# Patient Record
Sex: FEMALE | Race: WHITE | NOT HISPANIC OR LATINO | Employment: FULL TIME | URBAN - METROPOLITAN AREA
[De-identification: names, ages, dates, MRNs, and addresses within clinical notes are randomized per-mention and may not be internally consistent; named-entity substitution may affect disease eponyms.]

---

## 2017-02-14 ENCOUNTER — TELEPHONE (OUTPATIENT)
Dept: FAMILY MEDICINE | Facility: CLINIC | Age: 27
End: 2017-02-14

## 2017-02-14 NOTE — TELEPHONE ENCOUNTER
Panel Management - Physical w/ pap    Pt is due for Physical w/ pap  Called pt and  Pt no longer lives around this area, will note in demographics   MADELEINE Loya MA

## 2017-03-22 ENCOUNTER — OFFICE VISIT (OUTPATIENT)
Dept: FAMILY MEDICINE | Facility: CLINIC | Age: 27
End: 2017-03-22
Payer: COMMERCIAL

## 2017-03-22 VITALS
WEIGHT: 184 LBS | BODY MASS INDEX: 30.66 KG/M2 | DIASTOLIC BLOOD PRESSURE: 68 MMHG | HEART RATE: 64 BPM | HEIGHT: 65 IN | RESPIRATION RATE: 20 BRPM | SYSTOLIC BLOOD PRESSURE: 114 MMHG | TEMPERATURE: 97.9 F

## 2017-03-22 DIAGNOSIS — Z12.4 CERVICAL CANCER SCREENING: ICD-10-CM

## 2017-03-22 DIAGNOSIS — Z11.3 SCREEN FOR STD (SEXUALLY TRANSMITTED DISEASE): ICD-10-CM

## 2017-03-22 DIAGNOSIS — Z30.013 ENCOUNTER FOR INITIAL PRESCRIPTION OF INJECTABLE CONTRACEPTIVE: Primary | ICD-10-CM

## 2017-03-22 LAB — BETA HCG QUAL IFA URINE: NEGATIVE

## 2017-03-22 PROCEDURE — 96372 THER/PROPH/DIAG INJ SC/IM: CPT | Performed by: NURSE PRACTITIONER

## 2017-03-22 PROCEDURE — 87491 CHLMYD TRACH DNA AMP PROBE: CPT | Performed by: NURSE PRACTITIONER

## 2017-03-22 PROCEDURE — G0145 SCR C/V CYTO,THINLAYER,RESCR: HCPCS | Performed by: NURSE PRACTITIONER

## 2017-03-22 PROCEDURE — 84703 CHORIONIC GONADOTROPIN ASSAY: CPT | Performed by: NURSE PRACTITIONER

## 2017-03-22 PROCEDURE — 99213 OFFICE O/P EST LOW 20 MIN: CPT | Performed by: NURSE PRACTITIONER

## 2017-03-22 NOTE — LETTER
[facility]  [address] [facility] [address]  Phone: [phone]  Fax: [phone]    March 28, 2017    Julia High  [address] [facility] [address]          Dear Ms. High,    The results of your recent lab test for chlamydia was negative.       If you have any further questions or problems, please contact our office.    Sincerely,      Christina Villalobos CNP/ riya

## 2017-03-22 NOTE — LETTER
March 28, 2017      Julia Lennox  [address] [facility] [address]    Dear ,    I am happy to inform you that your recent cervical cancer screening test (PAP smear) was normal.      Preventative screenings such as this help to ensure your health for years to come. You should repeat a pap smear in 3 years, unless otherwise directed.      You will still need to return to the clinic every year for your annual exam and other preventive tests.     Please contact the clinic if you have further questions.       Sincerely,      Christina Villalobos, YOHANNES/rlm

## 2017-03-22 NOTE — PROGRESS NOTES
SUBJECTIVE:                                                    Julia High is a 26 year old female who presents to clinic today for the following health issues:      Contraception and Gyn exam         LMP  2 weeks ago. Last sexual activity 6 months ago.     Therapies tried and outcome: Was on Depo Provera 18 months ago. Was discontinued due to increased acne. Was on Oral contraceptive 6 months ago. Discontinued because she couldn't remember to take it daily.    Depo caused more acne  Pills can't remember to take  Thinking of implant  Contraception start -   Method interested in: depo or other injectable              Methods used previously: pill and depo provera  Problems with previous methods: see above    History of pregnancies:         Patient's last menstrual period was 2017.         No results found for: PAP  :    Para:    Menstrual cycle: irregular since on Depo  Flow: medium  History of migraines: no  Smoker: no  1st degree relative with History of: no                 Regular self breast exam: no    Accompanying Signs & Symptoms:   Dysuria: no  Vaginal discharge: no  Painful intercourse: no    Precipitating and/or Alleviating factors:    Currently sexually active: no  In stable relationship: no  Desire STD testing: no  Are you planning a pregnancy soon: no                 Body mass index is 30.62 kg/(m^2).      Results for orders placed or performed in visit on 17   Beta HCG qual IFA urine   Result Value Ref Range    Beta HCG Qual IFA Urine Negative NEG     No results found for: PAP  No known history of abnormal paps  Last pap done in [place] at [facility] in     HPI:     Patient Active Problem List   Diagnosis     Overweight (BMI 25.0-29.9)     No current outpatient prescriptions on file.  Labs reviewed in EPIC      Reviewed and updated as needed this visit by Provider  Allergies  Meds  Problems      ROS:  Constitutional, HEENT, cardiovascular, pulmonary, gi and gu systems are  "negative, except as otherwise noted.  INTEGUMENTARY/SKIN: history of acne  : normal menstrual cycles    PHYSICAL EXAM:   /68  Pulse 64  Temp 97.9  F (36.6  C) (Tympanic)  Resp 20  Ht 5' 5\" (1.651 m)  Wt 184 lb (83.5 kg)  LMP 03/08/2017  BMI 30.62 kg/m2  Body mass index is 30.62 kg/(m^2).  GENERAL APPEARANCE: healthy, alert and no distress  RESP: lungs clear to auscultation - no rales, rhonchi or wheezes  CV: regular rates and rhythm, normal S1 S2, no S3 or S4 and no murmur, click or rub   (female): normal cervix, adnexae, and uterus without masses or discharge  MS: extremities normal- no gross deformities noted  PSYCH: mentation appears normal and affect normal/bright      ASSESSMENT & PLAN:   (Z30.013) Encounter for initial prescription of injectable contraceptive  Comment:   Plan: MedroxyPROGESTERone Acetate 104 MG/0.65ML ASHWINI         injection            (Z11.3) Screen for STD (sexually transmitted disease)  Comment:   Plan: Beta HCG qual IFA urine, Chlamydia trachomatis         PCR            (Z12.4) Cervical cancer screening  Comment:   Plan: Pap imaged thin layer screen reflex to HPV if         ASCUS - recommend age 25 - 29            Depo Provera injection today  Patient Instructions     Results for orders placed or performed in visit on 03/22/17   Beta HCG qual IFA urine   Result Value Ref Range    Beta HCG Qual IFA Urine Negative NEG     Chlamydia and PAP tests ar pending    Depo Provera start today    If you would like to switch to Implanon, Dr. Bentley can place those in the clinic. Typically done the first 7 days of your menstrual cycle        Risks, benefits, side effects and rationale for treatment plan fully discussed with the patient and understanding expressed.    Christina Villalobos, KATIE-[facility]        "

## 2017-03-22 NOTE — MR AVS SNAPSHOT
"              After Visit Summary   3/22/2017    Julia High    MRN: [id number]           Patient Information     Date Of Birth          1990        Visit Information        Provider Department      3/22/2017 11:00 AM Christina Villalobos CNP [facility]        Today's Diagnoses     Contraceptive management    -  1    Encounter for initial prescription of injectable contraceptive        Screen for STD (sexually transmitted disease)        Cervical cancer screening          Care Instructions    Results for orders placed or performed in visit on 03/22/17   Beta HCG qual IFA urine   Result Value Ref Range    Beta HCG Qual IFA Urine Negative NEG     Chlamydia and PAP tests ar pending    Depo Provera start today    If you would like to switch to Implanon, Dr. Bentley can place those in the clinic. Typically done the first 7 days of your menstrual cycle            Follow-ups after your visit        Who to contact     If you have questions or need follow up information about today's clinic visit or your schedule please contact [facility] directly at [phone].  Normal or non-critical lab and imaging results will be communicated to you by MyChart, letter or phone within 4 business days after the clinic has received the results. If you do not hear from us within 7 days, please contact the clinic through [employer] or phone. If you have a critical or abnormal lab result, we will notify you by phone as soon as possible.  Submit refill requests through [employer] or call your pharmacy and they will forward the refill request to us. Please allow 3 business days for your refill to be completed.          Additional Information About Your Visit        [employer] Information     [employer] lets you send messages to your doctor, view your test results, renew your prescriptions, schedule appointments and more. To sign up, go to [email or web address] . Click on \"Log in\" on the left side of the screen, " "which will take you to the Welcome page. Then click on \"Sign up Now\" on the right side of the page.     You will be asked to enter the access code listed below, as well as some personal information. Please follow the directions to create your username and password.     Your access code is: [email or web address]  Expires: 2017 12:00 PM     Your access code will  in 90 days. If you need help or a new code, please call your [facility] or [phone].        Care EveryWhere ID     This is your Care EveryWhere ID. This could be used by other organizations to access your [facility] medical records  [id number]        Your Vitals Were     Pulse Temperature Respirations Height Last Period BMI (Body Mass Index)    64 97.9  F (36.6  C) (Tympanic) 20 5' 5\" (1.651 m) 2017 30.62 kg/m2       Blood Pressure from Last 3 Encounters:   17 114/68   16 124/80    Weight from Last 3 Encounters:   17 184 lb (83.5 kg)   16 183 lb (83 kg)              We Performed the Following     Beta HCG qual IFA urine     Chlamydia trachomatis PCR     Pap imaged thin layer screen reflex to HPV if ASCUS - recommend age 25 - 29          Today's Medication Changes          These changes are accurate as of: 3/22/17 12:00 PM.  If you have any questions, ask your nurse or doctor.               Start taking these medicines.        Dose/Directions    MedroxyPROGESTERone Acetate 104 MG/0.65ML Marie injection   Used for:  Encounter for initial prescription of injectable contraceptive   Started by:  Christina Villalobos CNP        Dose:  104 mg   Inject 104 mg Subcutaneous every 3 months   Quantity:  0.65 mL   Refills:  1            Where to get your medicines      These medications were sent to [facility] [address] [place] [address]  [address] [facility] [address]     Phone:  [phone]     MedroxyPROGESTERone Acetate 104 MG/0.65ML Marie injection                Primary Care Provider    None       No " address on file        Thank you!     Thank you for choosing [facility]  for your care. Our goal is always to provide you with excellent care. Hearing back from our patients is one way we can continue to improve our services. Please take a few minutes to complete the written survey that you may receive in the mail after your visit with us. Thank you!             Your Updated Medication List - Protect others around you: Learn how to safely use, store and throw away your medicines at www.disposemymeds.org.          This list is accurate as of: 3/22/17 12:00 PM.  Always use your most recent med list.                   Brand Name Dispense Instructions for use    MedroxyPROGESTERone Acetate 104 MG/0.65ML Marie injection     0.65 mL    Inject 104 mg Subcutaneous every 3 months

## 2017-03-22 NOTE — NURSING NOTE
"Chief Complaint   Patient presents with     Contraception       Initial /68  Pulse 64  Temp 97.9  F (36.6  C) (Tympanic)  Resp 20  Ht 5' 5\" (1.651 m)  Wt 184 lb (83.5 kg)  LMP 03/08/2017  BMI 30.62 kg/m2 Estimated body mass index is 30.62 kg/(m^2) as calculated from the following:    Height as of this encounter: 5' 5\" (1.651 m).    Weight as of this encounter: 184 lb (83.5 kg).  Medication Reconciliation: complete  "

## 2017-03-22 NOTE — PATIENT INSTRUCTIONS
Results for orders placed or performed in visit on 03/22/17   Beta HCG qual IFA urine   Result Value Ref Range    Beta HCG Qual IFA Urine Negative NEG     Chlamydia and PAP tests ar pending    Depo Provera start today    If you would like to switch to Implanon, Dr. Bentley can place those in the clinic. Typically done the first 7 days of your menstrual cycle

## 2017-03-23 LAB
C TRACH DNA SPEC QL NAA+PROBE: NORMAL
SPECIMEN SOURCE: NORMAL

## 2017-03-24 LAB
COPATH REPORT: NORMAL
PAP: NORMAL

## 2017-03-24 NOTE — PROGRESS NOTES
Negative chlamydia  Pap results pending  Please notify her of these results and send a hard copy if not on [employer].   Thanks. KATIE Finch

## 2017-06-22 ENCOUNTER — TELEPHONE (OUTPATIENT)
Dept: FAMILY MEDICINE | Facility: CLINIC | Age: 27
End: 2017-06-22

## 2017-06-22 NOTE — TELEPHONE ENCOUNTER
Patient left a vm stating she had gotten a birth control shot in March. She states she had talked to Christina about possibly switching birth control because the Depo gives her acne. She states she wants to go back on the pill but doesn't necessarily want to have to come in to see the provider.     Please advise.    Jewell Cooley-[place]

## 2017-06-22 NOTE — TELEPHONE ENCOUNTER
Spoke to pt, and she is going to schedule an appt with Dr Bentley to have the Implanon placed.  Maris Pineda RN

## 2017-07-12 ENCOUNTER — OFFICE VISIT (OUTPATIENT)
Dept: FAMILY MEDICINE | Facility: CLINIC | Age: 27
End: 2017-07-12
Payer: COMMERCIAL

## 2017-07-12 VITALS
SYSTOLIC BLOOD PRESSURE: 122 MMHG | OXYGEN SATURATION: 99 % | HEART RATE: 57 BPM | RESPIRATION RATE: 17 BRPM | BODY MASS INDEX: 30.62 KG/M2 | WEIGHT: 184 LBS | TEMPERATURE: 98 F | DIASTOLIC BLOOD PRESSURE: 80 MMHG

## 2017-07-12 DIAGNOSIS — Z30.8 ENCOUNTER FOR OTHER CONTRACEPTIVE MANAGEMENT: Primary | ICD-10-CM

## 2017-07-12 DIAGNOSIS — L70.0 ACNE VULGARIS: ICD-10-CM

## 2017-07-12 LAB — BETA HCG QUAL IFA URINE: NEGATIVE

## 2017-07-12 PROCEDURE — 84703 CHORIONIC GONADOTROPIN ASSAY: CPT | Performed by: FAMILY MEDICINE

## 2017-07-12 PROCEDURE — 99214 OFFICE O/P EST MOD 30 MIN: CPT | Performed by: FAMILY MEDICINE

## 2017-07-12 RX ORDER — CLINDAMYCIN AND BENZOYL PEROXIDE 10; 50 MG/G; MG/G
GEL TOPICAL 2 TIMES DAILY
Qty: 50 G | Refills: 3 | Status: SHIPPED | OUTPATIENT
Start: 2017-07-12 | End: 2022-10-27

## 2017-07-12 RX ORDER — DESOGESTREL AND ETHINYL ESTRADIOL 0.15-0.03
1 KIT ORAL DAILY
Qty: 84 TABLET | Refills: 1 | Status: SHIPPED | OUTPATIENT
Start: 2017-07-12 | End: 2018-02-02

## 2017-07-12 NOTE — NURSING NOTE
"Chief Complaint   Patient presents with     Contraception       Initial /80 (BP Location: Right arm, Patient Position: Chair, Cuff Size: Adult Large)  Pulse 57  Temp 98  F (36.7  C) (Tympanic)  Resp 17  Wt 184 lb (83.5 kg)  SpO2 99%  BMI 30.62 kg/m2 Estimated body mass index is 30.62 kg/(m^2) as calculated from the following:    Height as of 3/22/17: 5' 5\" (1.651 m).    Weight as of this encounter: 184 lb (83.5 kg).  Medication Reconciliation: complete    Health Maintenance that is potentially due pending provider review:  NONE    n/a    "

## 2017-07-12 NOTE — MR AVS SNAPSHOT
After Visit Summary   7/12/2017    Julia High    MRN: [id number]           Patient Information     Date Of Birth          1990        Visit Information        Provider Department      7/12/2017 10:40 AM Linwood Bentley MD [facility]        Today's Diagnoses     Encounter for other contraceptive management    -  1    Acne vulgaris          Care Instructions      Patient Education    Desogestrel, Ethinyl Estradiol Oral tablet, Desogestrel, Ethinyl Estradiol Oral tablet, Desogestrel, Ethinyl Estradiol Oral tablet, Inert Oral tablet    Desogestrel, Ethinyl Estradiol Oral tablet, Ethinyl Estradiol Oral tablet, Inert Oral tablet    Desogestrel, Ethinyl Estradiol Oral tablet, Inert Oral tablet  Desogestrel, Ethinyl Estradiol Oral tablet, Desogestrel, Ethinyl Estradiol Oral tablet, Desogestrel, Ethinyl Estradiol Oral tablet, Inert Oral tablet  What is this medicine?  ETHINYL ESTRADIOL; DESOGESTREL (ETH in il es tra DYE ole; jose alberto oh NEFTALI trel) is an oral contraceptive. The products combine two types of female hormones, an estrogen and a progestin. They are used to prevent ovulation and pregnancy.  This medicine may be used for other purposes; ask your health care provider or pharmacist if you have questions.  What should I tell my health care provider before I take this medicine?  They need to know if you have or ever had any of these conditions:    abnormal vaginal bleeding    blood vessel disease or blood clots    breast, cervical, endometrial, ovarian, liver, or uterine cancer    diabetes    gallbladder disease    heart disease or recent heart attack    high blood pressure    high cholesterol    kidney disease    liver disease    migraine headaches    stroke    systemic lupus erythematosus (SLE)    tobacco smoker    an unusual or allergic reaction to estrogens, progestins, other medicines, foods, dyes, or preservatives    pregnant or trying to get pregnant    breast-feeding  How  should I use this medicine?  Take this medicine by mouth. To reduce nausea, this medicine may be taken with food. Follow the directions on the prescription label. Take this medicine at the same time each day and in the order directed on the package. Do not take your medicine more often than directed.  Contact your pediatrician regarding the use of this medicine in children. Special care may be needed. This medicine has been used in female children who have started having menstrual periods.  A patient package insert for the product will be given with each prescription and refill. Read this sheet carefully each time. The sheet may change frequently.  Overdosage: If you think you have taken too much of this medicine contact a poison control center or emergency room at once.  NOTE: This medicine is only for you. Do not share this medicine with others.  What if I miss a dose?  If you miss a dose, refer to the patient information sheet you received with your medicine for direction. If you miss more than one pill, this medicine may not be as effective and you may need to use another form of birth control.  What may interact with this medicine?    acetaminophen    antibiotics or medicines for infections, especially rifampin, rifabutin, rifapentine, and griseofulvin, and possibly penicillins or tetracyclines    aprepitant    ascorbic acid (vitamin C)    atorvastatin    barbiturate medicines, such as phenobarbital    bosentan    carbamazepine    caffeine    clofibrate    cyclosporine    dantrolene    doxercalciferol    felbamate    grapefruit juice    hydrocortisone    medicines for anxiety or sleeping problems, such as diazepam or temazepam    medicines for diabetes, including pioglitazone    mineral oil    modafinil    mycophenolate    nefazodone    oxcarbazepine    phenytoin    prednisolone    ritonavir or other medicines for HIV infection or AIDS    rosuvastatin    selegiline    soy isoflavones supplements    [facility]  wort    tamoxifen or raloxifene    theophylline    thyroid hormones    topiramate    warfarin  This list may not describe all possible interactions. Give your health care provider a list of all the medicines, herbs, non-prescription drugs, or dietary supplements you use. Also tell them if you smoke, drink alcohol, or use illegal drugs. Some items may interact with your medicine.  What should I watch for while using this medicine?  Visit your doctor or health care professional for regular checks on your progress. You will need a regular breast and pelvic exam and Pap smear while on this medicine.  Use an additional method of contraception during the first cycle that you take these tablets.  If you have any reason to think you are pregnant, stop taking this medicine right away and contact your doctor or health care professional.  If you are taking this medicine for hormone related problems, it may take several cycles of use to see improvement in your condition.  Smoking increases the risk of getting a blood clot or having a stroke while you are taking birth control pills, especially if you are more than 35 years old. You are strongly advised not to smoke.  This medicine can make your body retain fluid, making your fingers, hands, or ankles swell. Your blood pressure can go up. Contact your doctor or health care professional if you feel you are retaining fluid.  This medicine can make you more sensitive to the sun. Keep out of the sun. If you cannot avoid being in the sun, wear protective clothing and use sunscreen. Do not use sun lamps or tanning beds/booths.  If you wear contact lenses and notice visual changes, or if the lenses begin to feel uncomfortable, consult your eye care specialist.  In some women, tenderness, swelling, or minor bleeding of the gums may occur. Notify your dentist if this happens. Brushing and flossing your teeth regularly may help limit this. See your dentist regularly and inform your dentist  of the medicines you are taking.  If you are going to have elective surgery, you may need to stop taking this medicine before the surgery. Consult your health care professional for advice.  This medicine does not protect you against HIV infection (AIDS) or any other sexually transmitted diseases.  What side effects may I notice from receiving this medicine?  Side effects that you should report to your doctor or health care professional as soon as possible:    allergic reactions like skin rash, itching or hives, swelling of the face, lips, or tongue    breast tissue changes or discharge    changes in vision    chest pain    confusion, trouble speaking or understanding    dark urine    general ill feeling or flu-like symptoms    light-colored stools    nausea, vomiting    pain, swelling, warmth in the leg    right upper belly pain    severe headaches    shortness of breath    sudden numbness or weakness of the face, arm or leg    trouble walking, dizziness, loss of balance or coordination    unusual vaginal bleeding    yellowing of the eyes or skin  Side effects that usually do not require medical attention (report to your doctor or health care professional if they continue or are bothersome):    back pain    breast tenderness    depressed mood or mood swings    hair loss    increased hunger or thirst    increased urination    fluid retention and swelling    stomach cramps or bloating    symptoms of vaginal infection like itching, irritation or unusual discharge    unusually weak or tired  This list may not describe all possible side effects. Call your doctor for medical advice about side effects. You may report side effects to FDA at 6-725-FDA-9153.  Where should I keep my medicine?  Keep out of the reach of children.  Store at room temperature between 15 and 30 degrees C (59 and 86 degrees F). Throw away any unused medicine after the expiration date.  NOTE:This sheet is a summary. It may not cover all possible  information. If you have questions about this medicine, talk to your doctor, pharmacist, or health care provider. Copyright  2016 [employer]        Adult Acne    If your skin is erupting with blemishes that you thought could only affect a teenager, you may have adult acne. This is acne in people over the age of 25. Acne in teenagers is more common in teen boys. Acne in adults is more common in women.  Acne is the term for oil-clogged pores. Pores are tiny openings on the skin that become inflamed and form blemishes. Adult acne blemishes show up mainly on the face. In women, blemishes tend to show up around the chin, mouth, jawline, and neck. In men, acne often affects the entire face. But the trunk and upper arms can also be involved.  What causes acne?  Male hormones (androgens) may cause acne in some people. Women with certain conditions such as polycystic ovarian syndrome may make too much male hormones. Acne in women often may happen just before their menstrual periods. If you had acne when you were a teenager or if others in your family have had acne, you may be at more risk for adult acne. The good news is that acne can be treated. Treatments can also decrease the scarring and changes to skin color caused by acne.  Types of acne  Acne happens when certain hair follicles are damaged. One or more of 4 things happen:    The hair follicle is blocked by dead cells and oil (sebum)    The follicle makes more oil (sebum) than normal    Bacteria (P. acnes) grow in the follicle    The follicle becomes irritated (inflamed)  Four types of blemishes can appear:    Whiteheads are round, white blemishes that form when hair follicles become clogged.    Blackheads are round, dark blemishes that form when whiteheads reach the skin s surface and touch air.    Pimples are red, swollen bumps that form when plugged follicle walls break near the skin s surface.    Deep cysts are pus-filled pimples. They form when plugged follicle  walls break deep within the skin. Acne cysts are often large and painful. In some cases, they also cause scars.  How treatment can help  The goals of treatment are to keep new acne blemishes from forming and to prevent scarring and changes in skin color. You will usually need a combination of treatments. You may need to use a treatment for at least 2 months to see if it works. This is because acne lesions take at least 8 weeks to develop.  Your treatment will depend on how serious your acne is. You and your healthcare provider can discuss the best way to treat and control it. In most cases, acne treatment includes:    Good skin care that doesn t damage or irritate skin    Medicines put on the skin (topical)    Antibiotics, hormones, or both  Often you will need to take several medicines at first. For some treatments, women must use a birth control method so they won t get pregnant while being treated.  Your healthcare provider may also remove blemishes or give you injections. If you have acne scars, you may need surgery or medicines to help improve the way your skin looks. Be sure you understand your treatment plan and any side effects it might cause. You will play an important role in the success of your treatment.  Date Last Reviewed: 2/1/2017 2000-2017 [employer]. [address] [place] [address], [place] [address]. All rights reserved. This information is not intended as a substitute for professional medical care. Always follow your healthcare professional's instructions.        Patient Education    Benzoyl Peroxide Topical gel, Clindamycin Phosphate Topical solution    Butylparaben, Methylparaben, Polyethylene Glycol, Propylparaben, Sodium cocoyl isethionate, Stearyl Alcohol, Water Topical lotion, Clindamycin Phosphate, Benzoyl Peroxide Topical gel    Clindamycin Phosphate, Benzoyl Peroxide Topical gel  Benzoyl Peroxide Topical gel, Clindamycin Phosphate Topical solution  What is this medicine?  BENZOYL  PEROXIDE;CLINDAMYCIN GEL (DANI isela ill per OX anabel; klin da RONIE sin) is used on the skin to treat mild to moderate acne.  This medicine may be used for other purposes; ask your health care provider or pharmacist if you have questions.  What should I tell my health care provider before I take this medicine?  They need to know if you have any of these conditions:    chronic skin problem like eczema    skin abrasions    stomach or colon problems    sunburn    an unusual or allergic reaction to benzoyl peroxide, clindamycin, other medicines, foods, dyes, or preservatives    pregnant or trying to get pregnant    breast-feeding  How should I use this medicine?  This medicine is for external use only. Follow the directions on the prescription label. Before applying, wash the affected area with a gentle cleanser and pat dry. Apply enough medicine to cover the area and rub in gently. Do not apply to raw or irritated skin. Avoid getting medicine in your eyes, lips, nose, mouth, or other sensitive areas. Finish the full course prescribed by your doctor or health care professional even if you think your condition is better. Do not stop using except on the advice of your doctor or health care professional.  Talk to your pediatrician regarding the use of this medicine in children. While this drug may be prescribed for children as young as 12 years of age for selected conditions, precautions do apply.  Overdosage: If you think you have taken too much of this medicine contact a poison control center or emergency room at once.  NOTE: This medicine is only for you. Do not share this medicine with others.  What if I miss a dose?  If you miss a dose, use it as soon as you can. If it almost time for your next dose, use only that dose. Do not use double or extra doses.  What may interact with this medicine?  Do not use any other acne product on the affected areas unless your health care professional tells you to. Check with your health  care professional about the use of this product if you are taking an oral medicine for acne.  This medicine may also interact with the following medications:    erythromycin    neuromuscular blocking agents    topical sulfone products  This list may not describe all possible interactions. Give your health care provider a list of all the medicines, herbs, non-prescription drugs, or dietary supplements you use. Also tell them if you smoke, drink alcohol, or use illegal drugs. Some items may interact with your medicine.  What should I watch for while using this medicine?  Your acne may get worse during the first few weeks of treatment with this medicine, and then start to improve. It may take 8 to 12 weeks before you see the full effect. If you do not see any improvement within 4 to 6 weeks, call your doctor or health care professional.  This medicine has caused severe allergic reactions in some patients. Stop using this medicine and contact your doctor or health care professional right away if you experience severe swelling or shortness of breath after using this medicine.  Do not wash areas of skin treated with this medicine for at least 1 hour after applying the medicine. If you experience excessive dry and peeling skin or skin irritation, check with your doctor or health care professional.  Do not use products that may dry the skin like medicated cosmetics, products that contain alcohol, or abrasive soaps or . Do not use other acne or skin treatment on the same area that you use this medicine unless your doctor or health care professional tells you to. If you use these together they can cause severe skin irritation.  This medicine can make you more sensitive to the sun. Keep out of the sun. If you cannot avoid being in the sun, wear protective clothing and use sunscreen. Do not use sun lamps or tanning beds/booths. This medicine may bleach hair or colored fabrics. Avoid getting this product on your  clothes.  What side effects may I notice from receiving this medicine?  Side effects that you should report to your doctor or health care professional as soon as possible:    allergic reactions like skin rash, itching or hives, swelling of the face, lips, or tongue    breathing problems    severe skin burning and irritation    severe skin dryness and peeling  Side effects that usually do not require medical attention (report to your doctor or health care professional if they continue or are bothersome):    increased sensitivity to the sun    mild to moderate skin burning or stinging    mild to moderate skin irritation, dryness or peeling  This list may not describe all possible side effects. Call your doctor for medical advice about side effects. You may report side effects to FDA at 8-000-FDA-3957.  Where should I keep my medicine?  Keep out of the reach of children.  Duac Gel, Acanya Gel, and Onexton Gel can be stored at room temperature up to 25 degrees C (77 degrees F) for up to 2 months. Do not freeze. Discard any unused product after 2 months. The expiration date will be applied to the medicine container by your pharmacist.  BenzaClin Gel can be stored at room temperature up to 25 degrees C (77 degrees F) for up to 3 months. Do not freeze. Discard any unused product after 3 months. The expiration date will be applied to the medicine container by your pharmacist.  NOTE: This sheet is a summary. It may not cover all possible information. If you have questions about this medicine, talk to your doctor, pharmacist, or health care provider.  NOTE:This sheet is a summary. It may not cover all possible information. If you have questions about this medicine, talk to your doctor, pharmacist, or health care provider. Copyright  2016 [employer] Standard                Follow-ups after your visit        Who to contact     If you have questions or need follow up information about today's clinic visit or your schedule please  "contact [facility] directly at [phone].  Normal or non-critical lab and imaging results will be communicated to you by MyChart, letter or phone within 4 business days after the clinic has received the results. If you do not hear from us within 7 days, please contact the clinic through MyChart or phone. If you have a critical or abnormal lab result, we will notify you by phone as soon as possible.  Submit refill requests through [employer] or call your pharmacy and they will forward the refill request to us. Please allow 3 business days for your refill to be completed.          Additional Information About Your Visit        [employer] Information     [employer] lets you send messages to your doctor, view your test results, renew your prescriptions, schedule appointments and more. To sign up, go to [email or web address] . Click on \"Log in\" on the left side of the screen, which will take you to the Welcome page. Then click on \"Sign up Now\" on the right side of the page.     You will be asked to enter the access code listed below, as well as some personal information. Please follow the directions to create your username and password.     Your access code is: [email or web address]  Expires: 10/4/2017  3:15 PM     Your access code will  in 90 days. If you need help or a new code, please call your [place] clinic or [phone].        Care EveryWhere ID     This is your Care EveryWhere ID. This could be used by other organizations to access your [place] medical records  [id number]        Your Vitals Were     Pulse Temperature Respirations Pulse Oximetry BMI (Body Mass Index)       57 98  F (36.7  C) (Tympanic) 17 99% 30.62 kg/m2        Blood Pressure from Last 3 Encounters:   17 122/80   17 114/68   16 124/80    Weight from Last 3 Encounters:   17 184 lb (83.5 kg)   17 184 lb (83.5 kg)   16 183 lb (83 kg)              We Performed the Following     Beta HCG qual IFA urine "          Today's Medication Changes          These changes are accurate as of: 7/12/17 11:30 AM.  If you have any questions, ask your nurse or doctor.               Start taking these medicines.        Dose/Directions    clindamycin-benzoyl peroxide gel   Commonly known as:  BENZACLIN   Used for:  Acne vulgaris   Started by:  Linwood Bentley MD        Apply topically 2 times daily   Quantity:  50 g   Refills:  3       desogestrel-ethinyl estradiol 0.15-30 MG-MCG per tablet   Commonly known as:  APRI   Used for:  Encounter for other contraceptive management   Started by:  Linwood Bentley MD        Dose:  1 tablet   Take 1 tablet by mouth daily   Quantity:  84 tablet   Refills:  1            Where to get your medicines      These medications were sent to [facility] [address] [place] [address]  [address] [facility] [address]     Phone:  [phone]     clindamycin-benzoyl peroxide gel    desogestrel-ethinyl estradiol 0.15-30 MG-MCG per tablet                Primary Care Provider    None       No address on file        Equal Access to Services     [facility] : Hadii aad ku hadasho Soomaali, waaxda luqadaha, qaybta kaalmada adeegyada, waxay trinidad dorsey . So [facility] [phone].    ATENCIÓN: Si habla español, tiene a venegas disposición servicios gratuitos de asistencia lingüística. LlSt. [facility] [phone].    We comply with applicable federal civil rights laws and [place] laws. We do not discriminate on the basis of race, color, national origin, age, disability sex, sexual orientation or gender identity.            Thank you!     Thank you for choosing [facility]  for your care. Our goal is always to provide you with excellent care. Hearing back from our patients is one way we can continue to improve our services. Please take a few minutes to complete the written survey that you may receive in the mail after your visit with us. Thank you!             Your  Updated Medication List - Protect others around you: Learn how to safely use, store and throw away your medicines at www.disposemymeds.org.          This list is accurate as of: 7/12/17 11:30 AM.  Always use your most recent med list.                   Brand Name Dispense Instructions for use Diagnosis    clindamycin-benzoyl peroxide gel    BENZACLIN    50 g    Apply topically 2 times daily    Acne vulgaris       desogestrel-ethinyl estradiol 0.15-30 MG-MCG per tablet    APRI    84 tablet    Take 1 tablet by mouth daily    Encounter for other contraceptive management

## 2017-07-12 NOTE — PATIENT INSTRUCTIONS
Patient Education    Desogestrel, Ethinyl Estradiol Oral tablet, Desogestrel, Ethinyl Estradiol Oral tablet, Desogestrel, Ethinyl Estradiol Oral tablet, Inert Oral tablet    Desogestrel, Ethinyl Estradiol Oral tablet, Ethinyl Estradiol Oral tablet, Inert Oral tablet    Desogestrel, Ethinyl Estradiol Oral tablet, Inert Oral tablet  Desogestrel, Ethinyl Estradiol Oral tablet, Desogestrel, Ethinyl Estradiol Oral tablet, Desogestrel, Ethinyl Estradiol Oral tablet, Inert Oral tablet  What is this medicine?  ETHINYL ESTRADIOL; DESOGESTREL (ETH in il es tra DYE ole; jose alberto oh NEFTALI trel) is an oral contraceptive. The products combine two types of female hormones, an estrogen and a progestin. They are used to prevent ovulation and pregnancy.  This medicine may be used for other purposes; ask your health care provider or pharmacist if you have questions.  What should I tell my health care provider before I take this medicine?  They need to know if you have or ever had any of these conditions:    abnormal vaginal bleeding    blood vessel disease or blood clots    breast, cervical, endometrial, ovarian, liver, or uterine cancer    diabetes    gallbladder disease    heart disease or recent heart attack    high blood pressure    high cholesterol    kidney disease    liver disease    migraine headaches    stroke    systemic lupus erythematosus (SLE)    tobacco smoker    an unusual or allergic reaction to estrogens, progestins, other medicines, foods, dyes, or preservatives    pregnant or trying to get pregnant    breast-feeding  How should I use this medicine?  Take this medicine by mouth. To reduce nausea, this medicine may be taken with food. Follow the directions on the prescription label. Take this medicine at the same time each day and in the order directed on the package. Do not take your medicine more often than directed.  Contact your pediatrician regarding the use of this medicine in children. Special care may be needed.  This medicine has been used in female children who have started having menstrual periods.  A patient package insert for the product will be given with each prescription and refill. Read this sheet carefully each time. The sheet may change frequently.  Overdosage: If you think you have taken too much of this medicine contact a poison control center or emergency room at once.  NOTE: This medicine is only for you. Do not share this medicine with others.  What if I miss a dose?  If you miss a dose, refer to the patient information sheet you received with your medicine for direction. If you miss more than one pill, this medicine may not be as effective and you may need to use another form of birth control.  What may interact with this medicine?    acetaminophen    antibiotics or medicines for infections, especially rifampin, rifabutin, rifapentine, and griseofulvin, and possibly penicillins or tetracyclines    aprepitant    ascorbic acid (vitamin C)    atorvastatin    barbiturate medicines, such as phenobarbital    bosentan    carbamazepine    caffeine    clofibrate    cyclosporine    dantrolene    doxercalciferol    felbamate    grapefruit juice    hydrocortisone    medicines for anxiety or sleeping problems, such as diazepam or temazepam    medicines for diabetes, including pioglitazone    mineral oil    modafinil    mycophenolate    nefazodone    oxcarbazepine    phenytoin    prednisolone    ritonavir or other medicines for HIV infection or AIDS    rosuvastatin    selegiline    soy isoflavones supplements    London's wort    tamoxifen or raloxifene    theophylline    thyroid hormones    topiramate    warfarin  This list may not describe all possible interactions. Give your health care provider a list of all the medicines, herbs, non-prescription drugs, or dietary supplements you use. Also tell them if you smoke, drink alcohol, or use illegal drugs. Some items may interact with your medicine.  What should I watch for  while using this medicine?  Visit your doctor or health care professional for regular checks on your progress. You will need a regular breast and pelvic exam and Pap smear while on this medicine.  Use an additional method of contraception during the first cycle that you take these tablets.  If you have any reason to think you are pregnant, stop taking this medicine right away and contact your doctor or health care professional.  If you are taking this medicine for hormone related problems, it may take several cycles of use to see improvement in your condition.  Smoking increases the risk of getting a blood clot or having a stroke while you are taking birth control pills, especially if you are more than 35 years old. You are strongly advised not to smoke.  This medicine can make your body retain fluid, making your fingers, hands, or ankles swell. Your blood pressure can go up. Contact your doctor or health care professional if you feel you are retaining fluid.  This medicine can make you more sensitive to the sun. Keep out of the sun. If you cannot avoid being in the sun, wear protective clothing and use sunscreen. Do not use sun lamps or tanning beds/booths.  If you wear contact lenses and notice visual changes, or if the lenses begin to feel uncomfortable, consult your eye care specialist.  In some women, tenderness, swelling, or minor bleeding of the gums may occur. Notify your dentist if this happens. Brushing and flossing your teeth regularly may help limit this. See your dentist regularly and inform your dentist of the medicines you are taking.  If you are going to have elective surgery, you may need to stop taking this medicine before the surgery. Consult your health care professional for advice.  This medicine does not protect you against HIV infection (AIDS) or any other sexually transmitted diseases.  What side effects may I notice from receiving this medicine?  Side effects that you should report to your  doctor or health care professional as soon as possible:    allergic reactions like skin rash, itching or hives, swelling of the face, lips, or tongue    breast tissue changes or discharge    changes in vision    chest pain    confusion, trouble speaking or understanding    dark urine    general ill feeling or flu-like symptoms    light-colored stools    nausea, vomiting    pain, swelling, warmth in the leg    right upper belly pain    severe headaches    shortness of breath    sudden numbness or weakness of the face, arm or leg    trouble walking, dizziness, loss of balance or coordination    unusual vaginal bleeding    yellowing of the eyes or skin  Side effects that usually do not require medical attention (report to your doctor or health care professional if they continue or are bothersome):    back pain    breast tenderness    depressed mood or mood swings    hair loss    increased hunger or thirst    increased urination    fluid retention and swelling    stomach cramps or bloating    symptoms of vaginal infection like itching, irritation or unusual discharge    unusually weak or tired  This list may not describe all possible side effects. Call your doctor for medical advice about side effects. You may report side effects to FDA at 0-683-WHP-3701.  Where should I keep my medicine?  Keep out of the reach of children.  Store at room temperature between 15 and 30 degrees C (59 and 86 degrees F). Throw away any unused medicine after the expiration date.  NOTE:This sheet is a summary. It may not cover all possible information. If you have questions about this medicine, talk to your doctor, pharmacist, or health care provider. Copyright  2016 [employer]        Adult Acne    If your skin is erupting with blemishes that you thought could only affect a teenager, you may have adult acne. This is acne in people over the age of 25. Acne in teenagers is more common in teen boys. Acne in adults is more common in women.   Acne is the term for oil-clogged pores. Pores are tiny openings on the skin that become inflamed and form blemishes. Adult acne blemishes show up mainly on the face. In women, blemishes tend to show up around the chin, mouth, jawline, and neck. In men, acne often affects the entire face. But the trunk and upper arms can also be involved.  What causes acne?  Male hormones (androgens) may cause acne in some people. Women with certain conditions such as polycystic ovarian syndrome may make too much male hormones. Acne in women often may happen just before their menstrual periods. If you had acne when you were a teenager or if others in your family have had acne, you may be at more risk for adult acne. The good news is that acne can be treated. Treatments can also decrease the scarring and changes to skin color caused by acne.  Types of acne  Acne happens when certain hair follicles are damaged. One or more of 4 things happen:    The hair follicle is blocked by dead cells and oil (sebum)    The follicle makes more oil (sebum) than normal    Bacteria (P. acnes) grow in the follicle    The follicle becomes irritated (inflamed)  Four types of blemishes can appear:    Whiteheads are round, white blemishes that form when hair follicles become clogged.    Blackheads are round, dark blemishes that form when whiteheads reach the skin s surface and touch air.    Pimples are red, swollen bumps that form when plugged follicle walls break near the skin s surface.    Deep cysts are pus-filled pimples. They form when plugged follicle walls break deep within the skin. Acne cysts are often large and painful. In some cases, they also cause scars.  How treatment can help  The goals of treatment are to keep new acne blemishes from forming and to prevent scarring and changes in skin color. You will usually need a combination of treatments. You may need to use a treatment for at least 2 months to see if it works. This is because acne  lesions take at least 8 weeks to develop.  Your treatment will depend on how serious your acne is. You and your healthcare provider can discuss the best way to treat and control it. In most cases, acne treatment includes:    Good skin care that doesn t damage or irritate skin    Medicines put on the skin (topical)    Antibiotics, hormones, or both  Often you will need to take several medicines at first. For some treatments, women must use a birth control method so they won t get pregnant while being treated.  Your healthcare provider may also remove blemishes or give you injections. If you have acne scars, you may need surgery or medicines to help improve the way your skin looks. Be sure you understand your treatment plan and any side effects it might cause. You will play an important role in the success of your treatment.  Date Last Reviewed: 2/1/2017 2000-2017 [employer]. [address] [place] [address]. All rights reserved. This information is not intended as a substitute for professional medical care. Always follow your healthcare professional's instructions.        Patient Education    Benzoyl Peroxide Topical gel, Clindamycin Phosphate Topical solution    Butylparaben, Methylparaben, Polyethylene Glycol, Propylparaben, Sodium cocoyl isethionate, Stearyl Alcohol, Water Topical lotion, Clindamycin Phosphate, Benzoyl Peroxide Topical gel    Clindamycin Phosphate, Benzoyl Peroxide Topical gel  Benzoyl Peroxide Topical gel, Clindamycin Phosphate Topical solution  What is this medicine?  BENZOYL PEROXIDE;CLINDAMYCIN GEL (DANI isela ill per OX anabel; klin da MYE sin) is used on the skin to treat mild to moderate acne.  This medicine may be used for other purposes; ask your health care provider or pharmacist if you have questions.  What should I tell my health care provider before I take this medicine?  They need to know if you have any of these conditions:    chronic skin problem like eczema     skin abrasions    stomach or colon problems    sunburn    an unusual or allergic reaction to benzoyl peroxide, clindamycin, other medicines, foods, dyes, or preservatives    pregnant or trying to get pregnant    breast-feeding  How should I use this medicine?  This medicine is for external use only. Follow the directions on the prescription label. Before applying, wash the affected area with a gentle cleanser and pat dry. Apply enough medicine to cover the area and rub in gently. Do not apply to raw or irritated skin. Avoid getting medicine in your eyes, lips, nose, mouth, or other sensitive areas. Finish the full course prescribed by your doctor or health care professional even if you think your condition is better. Do not stop using except on the advice of your doctor or health care professional.  Talk to your pediatrician regarding the use of this medicine in children. While this drug may be prescribed for children as young as 12 years of age for selected conditions, precautions do apply.  Overdosage: If you think you have taken too much of this medicine contact a poison control center or emergency room at once.  NOTE: This medicine is only for you. Do not share this medicine with others.  What if I miss a dose?  If you miss a dose, use it as soon as you can. If it almost time for your next dose, use only that dose. Do not use double or extra doses.  What may interact with this medicine?  Do not use any other acne product on the affected areas unless your health care professional tells you to. Check with your health care professional about the use of this product if you are taking an oral medicine for acne.  This medicine may also interact with the following medications:    erythromycin    neuromuscular blocking agents    topical sulfone products  This list may not describe all possible interactions. Give your health care provider a list of all the medicines, herbs, non-prescription drugs, or dietary supplements  you use. Also tell them if you smoke, drink alcohol, or use illegal drugs. Some items may interact with your medicine.  What should I watch for while using this medicine?  Your acne may get worse during the first few weeks of treatment with this medicine, and then start to improve. It may take 8 to 12 weeks before you see the full effect. If you do not see any improvement within 4 to 6 weeks, call your doctor or health care professional.  This medicine has caused severe allergic reactions in some patients. Stop using this medicine and contact your doctor or health care professional right away if you experience severe swelling or shortness of breath after using this medicine.  Do not wash areas of skin treated with this medicine for at least 1 hour after applying the medicine. If you experience excessive dry and peeling skin or skin irritation, check with your doctor or health care professional.  Do not use products that may dry the skin like medicated cosmetics, products that contain alcohol, or abrasive soaps or . Do not use other acne or skin treatment on the same area that you use this medicine unless your doctor or health care professional tells you to. If you use these together they can cause severe skin irritation.  This medicine can make you more sensitive to the sun. Keep out of the sun. If you cannot avoid being in the sun, wear protective clothing and use sunscreen. Do not use sun lamps or tanning beds/booths. This medicine may bleach hair or colored fabrics. Avoid getting this product on your clothes.  What side effects may I notice from receiving this medicine?  Side effects that you should report to your doctor or health care professional as soon as possible:    allergic reactions like skin rash, itching or hives, swelling of the face, lips, or tongue    breathing problems    severe skin burning and irritation    severe skin dryness and peeling  Side effects that usually do not require medical  attention (report to your doctor or health care professional if they continue or are bothersome):    increased sensitivity to the sun    mild to moderate skin burning or stinging    mild to moderate skin irritation, dryness or peeling  This list may not describe all possible side effects. Call your doctor for medical advice about side effects. You may report side effects to FDA at 6-169-FDA-1982.  Where should I keep my medicine?  Keep out of the reach of children.  Duac Gel, Acanya Gel, and Onexton Gel can be stored at room temperature up to 25 degrees C (77 degrees F) for up to 2 months. Do not freeze. Discard any unused product after 2 months. The expiration date will be applied to the medicine container by your pharmacist.  BenzaClin Gel can be stored at room temperature up to 25 degrees C (77 degrees F) for up to 3 months. Do not freeze. Discard any unused product after 3 months. The expiration date will be applied to the medicine container by your pharmacist.  NOTE: This sheet is a summary. It may not cover all possible information. If you have questions about this medicine, talk to your doctor, pharmacist, or health care provider.  NOTE:This sheet is a summary. It may not cover all possible information. If you have questions about this medicine, talk to your doctor, pharmacist, or health care provider. Copyright  2016 [employer]

## 2017-07-12 NOTE — PROGRESS NOTES
SUBJECTIVE:                                                    Julia High is a 26 year old female who presents to clinic today for the following health issues:      Birth control/acne    Patient would like to discuss different options for birth control. The depo shot gives her bad acne. Last sexual intercourse about a week ago. Last LMP unsure, 2 weeks overdue for depot shots (getting for about 5 years now)    Also concerned about acne, having for many years, was seen by dermatologist in the past, not taking any specific medication for quite some time        Problem list and histories reviewed & adjusted, as indicated.  Additional history: as documented    Patient Active Problem List   Diagnosis     Overweight (BMI 25.0-29.9)     History reviewed. No pertinent surgical history.    Social History   Substance Use Topics     Smoking status: Never Smoker     Smokeless tobacco: Never Used     Alcohol use 0.0 oz/week     0 Standard drinks or equivalent per week      Comment: Occ      Family History   Problem Relation Age of Onset     Hypertension Mother      Unknown/Adopted Father          Current Outpatient Prescriptions   Medication Sig Dispense Refill     desogestrel-ethinyl estradiol (APRI) 0.15-30 MG-MCG per tablet Take 1 tablet by mouth daily 84 tablet 1     clindamycin-benzoyl peroxide (BENZACLIN) gel Apply topically 2 times daily 50 g 3     Allergies   Allergen Reactions     Ceclor [Cefaclor] Hives     Morphine Hives     Recent Labs   Lab Test  06/21/16   1107   LDL  121*   ALT  23   CR  0.90   GFRESTIMATED  76   GFRESTBLACK  >90   GFR Calc     POTASSIUM  4.0      BP Readings from Last 3 Encounters:   07/12/17 122/80   03/22/17 114/68   06/21/16 124/80    Wt Readings from Last 3 Encounters:   07/12/17 184 lb (83.5 kg)   03/22/17 184 lb (83.5 kg)   06/21/16 183 lb (83 kg)                  Labs reviewed in EPIC    Reviewed and updated as needed this visit by clinical staff  Tobacco  Allergies   Med Hx  Surg Hx  Fam Hx  Soc Hx      Reviewed and updated as needed this visit by Provider         ROS:  Constitutional, HEENT, cardiovascular, pulmonary, gi and gu systems are negative, except as otherwise noted.    OBJECTIVE:     /80 (BP Location: Right arm, Patient Position: Chair, Cuff Size: Adult Large)  Pulse 57  Temp 98  F (36.7  C) (Tympanic)  Resp 17  Wt 184 lb (83.5 kg)  SpO2 99%  BMI 30.62 kg/m2  Body mass index is 30.62 kg/(m^2).  GENERAL: healthy, alert and no distress  NECK: no adenopathy, no asymmetry, masses, or scars and thyroid normal to palpation  RESP: lungs clear to auscultation - no rales, rhonchi or wheezes  CV: regular rate and rhythm, normal S1 S2, no S3 or S4, no murmur, click or rub, no peripheral edema and peripheral pulses strong  ABDOMEN: soft, nontender, no hepatosplenomegaly, no masses and bowel sounds normal  MS: no gross musculoskeletal defects noted, no edema  SKIN: nodular acne lesions, brownish/erythematous, involving facial skin, no discharge or blistering noted    Diagnostic Test Results:  Results for orders placed or performed in visit on 07/12/17 (from the past 24 hour(s))   Beta HCG qual IFA urine   Result Value Ref Range    Beta HCG Qual IFA Urine Negative NEG       ASSESSMENT/PLAN:         1. Encounter for other contraceptive management  - Various contraception methods discussed including IUD and nexplanon   - Patient decided to take oral contraceptive pill, pros and cons discussed  - Could be beneficial for acne vulgaris as well  - Beta HCG qual IFA urine  - desogestrel-ethinyl estradiol (APRI) 0.15-30 MG-MCG per tablet; Take 1 tablet by mouth daily  Dispense: 84 tablet; Refill: 1      2. Acne vulgaris  - BenzaClin prescribed, common side effects discussed  - Follow up if symptoms persist or worsen  - Written information provided as below  - clindamycin-benzoyl peroxide (BENZACLIN) gel; Apply topically 2 times daily  Dispense: 50 g; Refill:  3      MEDICATIONS:   Orders Placed This Encounter   Medications     desogestrel-ethinyl estradiol (APRI) 0.15-30 MG-MCG per tablet     Sig: Take 1 tablet by mouth daily     Dispense:  84 tablet     Refill:  1     clindamycin-benzoyl peroxide (BENZACLIN) gel     Sig: Apply topically 2 times daily     Dispense:  50 g     Refill:  3     Patient Instructions     Patient Education    Desogestrel, Ethinyl Estradiol Oral tablet, Desogestrel, Ethinyl Estradiol Oral tablet, Desogestrel, Ethinyl Estradiol Oral tablet, Inert Oral tablet    Desogestrel, Ethinyl Estradiol Oral tablet, Ethinyl Estradiol Oral tablet, Inert Oral tablet    Desogestrel, Ethinyl Estradiol Oral tablet, Inert Oral tablet  Desogestrel, Ethinyl Estradiol Oral tablet, Desogestrel, Ethinyl Estradiol Oral tablet, Desogestrel, Ethinyl Estradiol Oral tablet, Inert Oral tablet  What is this medicine?  ETHINYL ESTRADIOL; DESOGESTREL (ETH in il es tra DYE ole; jose alberto oh NEFTALI trel) is an oral contraceptive. The products combine two types of female hormones, an estrogen and a progestin. They are used to prevent ovulation and pregnancy.  This medicine may be used for other purposes; ask your health care provider or pharmacist if you have questions.  What should I tell my health care provider before I take this medicine?  They need to know if you have or ever had any of these conditions:    abnormal vaginal bleeding    blood vessel disease or blood clots    breast, cervical, endometrial, ovarian, liver, or uterine cancer    diabetes    gallbladder disease    heart disease or recent heart attack    high blood pressure    high cholesterol    kidney disease    liver disease    migraine headaches    stroke    systemic lupus erythematosus (SLE)    tobacco smoker    an unusual or allergic reaction to estrogens, progestins, other medicines, foods, dyes, or preservatives    pregnant or trying to get pregnant    breast-feeding  How should I use this medicine?  Take this medicine  by mouth. To reduce nausea, this medicine may be taken with food. Follow the directions on the prescription label. Take this medicine at the same time each day and in the order directed on the package. Do not take your medicine more often than directed.  Contact your pediatrician regarding the use of this medicine in children. Special care may be needed. This medicine has been used in female children who have started having menstrual periods.  A patient package insert for the product will be given with each prescription and refill. Read this sheet carefully each time. The sheet may change frequently.  Overdosage: If you think you have taken too much of this medicine contact a poison control center or emergency room at once.  NOTE: This medicine is only for you. Do not share this medicine with others.  What if I miss a dose?  If you miss a dose, refer to the patient information sheet you received with your medicine for direction. If you miss more than one pill, this medicine may not be as effective and you may need to use another form of birth control.  What may interact with this medicine?    acetaminophen    antibiotics or medicines for infections, especially rifampin, rifabutin, rifapentine, and griseofulvin, and possibly penicillins or tetracyclines    aprepitant    ascorbic acid (vitamin C)    atorvastatin    barbiturate medicines, such as phenobarbital    bosentan    carbamazepine    caffeine    clofibrate    cyclosporine    dantrolene    doxercalciferol    felbamate    grapefruit juice    hydrocortisone    medicines for anxiety or sleeping problems, such as diazepam or temazepam    medicines for diabetes, including pioglitazone    mineral oil    modafinil    mycophenolate    nefazodone    oxcarbazepine    phenytoin    prednisolone    ritonavir or other medicines for HIV infection or AIDS    rosuvastatin    selegiline    soy isoflavones supplements    Suki's wort    tamoxifen or  raloxifene    theophylline    thyroid hormones    topiramate    warfarin  This list may not describe all possible interactions. Give your health care provider a list of all the medicines, herbs, non-prescription drugs, or dietary supplements you use. Also tell them if you smoke, drink alcohol, or use illegal drugs. Some items may interact with your medicine.  What should I watch for while using this medicine?  Visit your doctor or health care professional for regular checks on your progress. You will need a regular breast and pelvic exam and Pap smear while on this medicine.  Use an additional method of contraception during the first cycle that you take these tablets.  If you have any reason to think you are pregnant, stop taking this medicine right away and contact your doctor or health care professional.  If you are taking this medicine for hormone related problems, it may take several cycles of use to see improvement in your condition.  Smoking increases the risk of getting a blood clot or having a stroke while you are taking birth control pills, especially if you are more than 35 years old. You are strongly advised not to smoke.  This medicine can make your body retain fluid, making your fingers, hands, or ankles swell. Your blood pressure can go up. Contact your doctor or health care professional if you feel you are retaining fluid.  This medicine can make you more sensitive to the sun. Keep out of the sun. If you cannot avoid being in the sun, wear protective clothing and use sunscreen. Do not use sun lamps or tanning beds/booths.  If you wear contact lenses and notice visual changes, or if the lenses begin to feel uncomfortable, consult your eye care specialist.  In some women, tenderness, swelling, or minor bleeding of the gums may occur. Notify your dentist if this happens. Brushing and flossing your teeth regularly may help limit this. See your dentist regularly and inform your dentist of the medicines you  are taking.  If you are going to have elective surgery, you may need to stop taking this medicine before the surgery. Consult your health care professional for advice.  This medicine does not protect you against HIV infection (AIDS) or any other sexually transmitted diseases.  What side effects may I notice from receiving this medicine?  Side effects that you should report to your doctor or health care professional as soon as possible:    allergic reactions like skin rash, itching or hives, swelling of the face, lips, or tongue    breast tissue changes or discharge    changes in vision    chest pain    confusion, trouble speaking or understanding    dark urine    general ill feeling or flu-like symptoms    light-colored stools    nausea, vomiting    pain, swelling, warmth in the leg    right upper belly pain    severe headaches    shortness of breath    sudden numbness or weakness of the face, arm or leg    trouble walking, dizziness, loss of balance or coordination    unusual vaginal bleeding    yellowing of the eyes or skin  Side effects that usually do not require medical attention (report to your doctor or health care professional if they continue or are bothersome):    back pain    breast tenderness    depressed mood or mood swings    hair loss    increased hunger or thirst    increased urination    fluid retention and swelling    stomach cramps or bloating    symptoms of vaginal infection like itching, irritation or unusual discharge    unusually weak or tired  This list may not describe all possible side effects. Call your doctor for medical advice about side effects. You may report side effects to FDA at 3-279-FDA-5090.  Where should I keep my medicine?  Keep out of the reach of children.  Store at room temperature between 15 and 30 degrees C (59 and 86 degrees F). Throw away any unused medicine after the expiration date.  NOTE:This sheet is a summary. It may not cover all possible information. If you have  questions about this medicine, talk to your doctor, pharmacist, or health care provider. Copyright  2016 [employer]        Adult Acne    If your skin is erupting with blemishes that you thought could only affect a teenager, you may have adult acne. This is acne in people over the age of 25. Acne in teenagers is more common in teen boys. Acne in adults is more common in women.  Acne is the term for oil-clogged pores. Pores are tiny openings on the skin that become inflamed and form blemishes. Adult acne blemishes show up mainly on the face. In women, blemishes tend to show up around the chin, mouth, jawline, and neck. In men, acne often affects the entire face. But the trunk and upper arms can also be involved.  What causes acne?  Male hormones (androgens) may cause acne in some people. Women with certain conditions such as polycystic ovarian syndrome may make too much male hormones. Acne in women often may happen just before their menstrual periods. If you had acne when you were a teenager or if others in your family have had acne, you may be at more risk for adult acne. The good news is that acne can be treated. Treatments can also decrease the scarring and changes to skin color caused by acne.  Types of acne  Acne happens when certain hair follicles are damaged. One or more of 4 things happen:    The hair follicle is blocked by dead cells and oil (sebum)    The follicle makes more oil (sebum) than normal    Bacteria (P. acnes) grow in the follicle    The follicle becomes irritated (inflamed)  Four types of blemishes can appear:    Whiteheads are round, white blemishes that form when hair follicles become clogged.    Blackheads are round, dark blemishes that form when whiteheads reach the skin s surface and touch air.    Pimples are red, swollen bumps that form when plugged follicle walls break near the skin s surface.    Deep cysts are pus-filled pimples. They form when plugged follicle walls break deep within  the skin. Acne cysts are often large and painful. In some cases, they also cause scars.  How treatment can help  The goals of treatment are to keep new acne blemishes from forming and to prevent scarring and changes in skin color. You will usually need a combination of treatments. You may need to use a treatment for at least 2 months to see if it works. This is because acne lesions take at least 8 weeks to develop.  Your treatment will depend on how serious your acne is. You and your healthcare provider can discuss the best way to treat and control it. In most cases, acne treatment includes:    Good skin care that doesn t damage or irritate skin    Medicines put on the skin (topical)    Antibiotics, hormones, or both  Often you will need to take several medicines at first. For some treatments, women must use a birth control method so they won t get pregnant while being treated.  Your healthcare provider may also remove blemishes or give you injections. If you have acne scars, you may need surgery or medicines to help improve the way your skin looks. Be sure you understand your treatment plan and any side effects it might cause. You will play an important role in the success of your treatment.  Date Last Reviewed: 2/1/2017 2000-2017 [employer]. [address] [place] [address]. All rights reserved. This information is not intended as a substitute for professional medical care. Always follow your healthcare professional's instructions.        Patient Education    Benzoyl Peroxide Topical gel, Clindamycin Phosphate Topical solution    Butylparaben, Methylparaben, Polyethylene Glycol, Propylparaben, Sodium cocoyl isethionate, Stearyl Alcohol, Water Topical lotion, Clindamycin Phosphate, Benzoyl Peroxide Topical gel    Clindamycin Phosphate, Benzoyl Peroxide Topical gel  Benzoyl Peroxide Topical gel, Clindamycin Phosphate Topical solution  What is this medicine?  BENZOYL PEROXIDE;CLINDAMYCIN  GEL (DANI isela ill per OX anabel; klin da KELLY sin) is used on the skin to treat mild to moderate acne.  This medicine may be used for other purposes; ask your health care provider or pharmacist if you have questions.  What should I tell my health care provider before I take this medicine?  They need to know if you have any of these conditions:    chronic skin problem like eczema    skin abrasions    stomach or colon problems    sunburn    an unusual or allergic reaction to benzoyl peroxide, clindamycin, other medicines, foods, dyes, or preservatives    pregnant or trying to get pregnant    breast-feeding  How should I use this medicine?  This medicine is for external use only. Follow the directions on the prescription label. Before applying, wash the affected area with a gentle cleanser and pat dry. Apply enough medicine to cover the area and rub in gently. Do not apply to raw or irritated skin. Avoid getting medicine in your eyes, lips, nose, mouth, or other sensitive areas. Finish the full course prescribed by your doctor or health care professional even if you think your condition is better. Do not stop using except on the advice of your doctor or health care professional.  Talk to your pediatrician regarding the use of this medicine in children. While this drug may be prescribed for children as young as 12 years of age for selected conditions, precautions do apply.  Overdosage: If you think you have taken too much of this medicine contact a poison control center or emergency room at once.  NOTE: This medicine is only for you. Do not share this medicine with others.  What if I miss a dose?  If you miss a dose, use it as soon as you can. If it almost time for your next dose, use only that dose. Do not use double or extra doses.  What may interact with this medicine?  Do not use any other acne product on the affected areas unless your health care professional tells you to. Check with your health care professional about  the use of this product if you are taking an oral medicine for acne.  This medicine may also interact with the following medications:    erythromycin    neuromuscular blocking agents    topical sulfone products  This list may not describe all possible interactions. Give your health care provider a list of all the medicines, herbs, non-prescription drugs, or dietary supplements you use. Also tell them if you smoke, drink alcohol, or use illegal drugs. Some items may interact with your medicine.  What should I watch for while using this medicine?  Your acne may get worse during the first few weeks of treatment with this medicine, and then start to improve. It may take 8 to 12 weeks before you see the full effect. If you do not see any improvement within 4 to 6 weeks, call your doctor or health care professional.  This medicine has caused severe allergic reactions in some patients. Stop using this medicine and contact your doctor or health care professional right away if you experience severe swelling or shortness of breath after using this medicine.  Do not wash areas of skin treated with this medicine for at least 1 hour after applying the medicine. If you experience excessive dry and peeling skin or skin irritation, check with your doctor or health care professional.  Do not use products that may dry the skin like medicated cosmetics, products that contain alcohol, or abrasive soaps or . Do not use other acne or skin treatment on the same area that you use this medicine unless your doctor or health care professional tells you to. If you use these together they can cause severe skin irritation.  This medicine can make you more sensitive to the sun. Keep out of the sun. If you cannot avoid being in the sun, wear protective clothing and use sunscreen. Do not use sun lamps or tanning beds/booths. This medicine may bleach hair or colored fabrics. Avoid getting this product on your clothes.  What side effects may  I notice from receiving this medicine?  Side effects that you should report to your doctor or health care professional as soon as possible:    allergic reactions like skin rash, itching or hives, swelling of the face, lips, or tongue    breathing problems    severe skin burning and irritation    severe skin dryness and peeling  Side effects that usually do not require medical attention (report to your doctor or health care professional if they continue or are bothersome):    increased sensitivity to the sun    mild to moderate skin burning or stinging    mild to moderate skin irritation, dryness or peeling  This list may not describe all possible side effects. Call your doctor for medical advice about side effects. You may report side effects to FDA at 2-406-FDA-1305.  Where should I keep my medicine?  Keep out of the reach of children.  Duac Gel, Acanya Gel, and Onexton Gel can be stored at room temperature up to 25 degrees C (77 degrees F) for up to 2 months. Do not freeze. Discard any unused product after 2 months. The expiration date will be applied to the medicine container by your pharmacist.  BenzaClin Gel can be stored at room temperature up to 25 degrees C (77 degrees F) for up to 3 months. Do not freeze. Discard any unused product after 3 months. The expiration date will be applied to the medicine container by your pharmacist.  NOTE: This sheet is a summary. It may not cover all possible information. If you have questions about this medicine, talk to your doctor, pharmacist, or health care provider.  NOTE:This sheet is a summary. It may not cover all possible information. If you have questions about this medicine, talk to your doctor, pharmacist, or health care provider. Copyright  2016 [employer]            Linwood Bentley MD  [facility]

## 2018-01-27 ENCOUNTER — RADIANT APPOINTMENT (OUTPATIENT)
Dept: GENERAL RADIOLOGY | Facility: CLINIC | Age: 28
End: 2018-01-27
Attending: NURSE PRACTITIONER
Payer: COMMERCIAL

## 2018-01-27 ENCOUNTER — OFFICE VISIT (OUTPATIENT)
Dept: URGENT CARE | Facility: URGENT CARE | Age: 28
End: 2018-01-27
Payer: COMMERCIAL

## 2018-01-27 VITALS
BODY MASS INDEX: 30.95 KG/M2 | OXYGEN SATURATION: 100 % | SYSTOLIC BLOOD PRESSURE: 116 MMHG | DIASTOLIC BLOOD PRESSURE: 72 MMHG | RESPIRATION RATE: 16 BRPM | TEMPERATURE: 98.2 F | HEART RATE: 52 BPM | WEIGHT: 186 LBS

## 2018-01-27 DIAGNOSIS — R05.9 COUGH: ICD-10-CM

## 2018-01-27 DIAGNOSIS — B34.9 VIRAL SYNDROME: Primary | ICD-10-CM

## 2018-01-27 PROCEDURE — 99213 OFFICE O/P EST LOW 20 MIN: CPT | Performed by: NURSE PRACTITIONER

## 2018-01-27 PROCEDURE — 71046 X-RAY EXAM CHEST 2 VIEWS: CPT | Mod: FY

## 2018-01-27 NOTE — PATIENT INSTRUCTIONS
"Increase rest and fluids. Tylenol and/or Ibuprofen for comfort. Cool mist vaporizer. If your symptoms worsen or do not resolve follow up with your primary care provider in 1 week and sooner if needed.     Mucinex 600 mg 12 hour formula for ear, head and chest congestion.  It can also thin post nasal drip which can cause a cough and sore throat.       Indications for emergent return to emergency department discussed with patient, who verbalized good understanding and agreement.  Patient understands the limitations of today's evaluation.           Viral Syndrome (Adult)  A viral illness may cause a number of symptoms. The symptoms depend on the part of the body that the virus affects. If it settles in your nose, throat, and lungs, it may cause cough, sore throat, congestion, and sometimes headache. If it settles in your stomach and intestinal tract, it may cause vomiting and diarrhea. Sometimes it causes vague symptoms like \"aching all over,\" feeling tired, loss of appetite, or fever.  A viral illness usually lasts 1 to 2 weeks, but sometimes it lasts longer. In some cases, a more serious infection can look like a viral syndrome in the first few days of the illness. You may need another exam and additional tests to know the difference. Watch for the warning signs listed below.  Home care  Follow these guidelines for taking care of yourself at home:    If symptoms are severe, rest at home for the first 2 to 3 days.    Stay away from cigarette smoke - both your smoke and the smoke from others.    You may use over-the-counter acetaminophen or ibuprofen for fever, muscle aching, and headache, unless another medicine was prescribed for this. If you have chronic liver or kidney disease or ever had a stomach ulcer or GI bleeding, talk with your doctor before using these medicines. No one who is younger than 18 and ill with a fever should take aspirin. It may cause severe disease or death.    Your appetite may be poor, so a " light diet is fine. Avoid dehydration by drinking 8 to 12 8-ounce glasses of fluids each day. This may include water; orange juice; lemonade; apple, grape, and cranberry juice; clear fruit drinks; electrolyte replacement and sports drinks; and decaffeinated teas and coffee. If you have been diagnosed with a kidney disease, ask your doctor how much and what types of fluids you should drink to prevent dehydration. If you have kidney disease, drinking too much fluid can cause it build up in the your body and be dangerous to your health.    Over-the-counter remedies won't shorten the length of the illness but may be helpful for cough, sore throat; and nasal and sinus congestion. Don't use decongestants if you have high blood pressure.  Follow-up care  Follow up with your healthcare provider if you do not improve over the next week.  Call 911  Get emergency medical care if any of the following occur:    Convulsion    Feeling weak, dizzy, or like you are going to faint    Chest pain, shortness of breath, wheezing, or difficulty breathing  When to seek medical advice  Call your healthcare provider right away if any of these occur:    Cough with lots of colored sputum (mucus) or blood in your sputum    Chest pain, shortness of breath, wheezing, or difficulty breathing    Severe headache; face, neck, or ear pain    Severe, constant pain in the lower right side of your belly (abdominal)    Continued vomiting (can t keep liquids down)    Frequent diarrhea (more than 5 times a day); blood (red or black color) or mucus in diarrhea    Feeling weak, dizzy, or like you are going to faint    Extreme thirst    Fever of 100.4 F (38 C) or higher, or as directed by your healthcare provider  Date Last Reviewed: 9/25/2015 2000-2017 [employer]. [address] [place] [address], [place] [address]. All rights reserved. This information is not intended as a substitute for professional medical care. Always follow your healthcare  professional's instructions.

## 2018-01-27 NOTE — PROGRESS NOTES
SUBJECTIVE:   Julia High is a 27 year old female who presents to clinic today for the following health issues:  Chief Complaint   Patient presents with     Cough     couple days, chest congestion, no nausea or vomiting, no fever, no chills/sweats, no sore throat, no ear pain, runny nose and congestion, headache, no sinus pressure, coughed up blood yesterday                    Problem list and histories reviewed & adjusted, as indicated.  Additional history: as documented    Patient Active Problem List   Diagnosis     Overweight (BMI 25.0-29.9)     History reviewed. No pertinent surgical history.    Social History   Substance Use Topics     Smoking status: Never Smoker     Smokeless tobacco: Never Used     Alcohol use 0.0 oz/week     0 Standard drinks or equivalent per week      Comment: Occ      Family History   Problem Relation Age of Onset     Hypertension Mother      Unknown/Adopted Father          Current Outpatient Prescriptions   Medication Sig Dispense Refill     desogestrel-ethinyl estradiol (APRI) 0.15-30 MG-MCG per tablet Take 1 tablet by mouth daily 84 tablet 1     clindamycin-benzoyl peroxide (BENZACLIN) gel Apply topically 2 times daily (Patient not taking: Reported on 1/27/2018) 50 g 3     Allergies   Allergen Reactions     Ceclor [Cefaclor] Hives     Morphine Hives     Labs reviewed in EPIC    Reviewed and updated as needed this visit by clinical staff  Tobacco  Allergies  Meds  Problems  Med Hx  Surg Hx  Fam Hx  Soc Hx        Reviewed and updated as needed this visit by Provider  Allergies  Meds  Problems         ROS:  Constitutional, HEENT, cardiovascular, pulmonary, GI, , musculoskeletal, neuro, skin, endocrine and psych systems are negative, except as otherwise noted.    OBJECTIVE:     /72  Pulse 52  Temp 98.2  F (36.8  C) (Tympanic)  Resp 16  Wt 186 lb (84.4 kg)  SpO2 100%  BMI 30.95 kg/m2  Body mass index is 30.95 kg/(m^2).   GENERAL: healthy, alert and no distress,  "nontoxic in appearance  EYES: Eyes grossly normal to inspection, PERRL and conjunctivae and sclerae normal  HENT: ear canals and TM's normal, nose and mouth without ulcers or lesions  NECK: no adenopathy, supple with full ROM  RESP: lungs clear to auscultation - no rales, rhonchi but has occasional wheezes  CV: regular rate and rhythm, normal S1 S2, no S3 or S4, no murmur, click or rub, no peripheral edema   ABDOMEN: soft, nontender, no hepatosplenomegaly, no masses and bowel sounds normal  MS: no gross musculoskeletal defects noted, no edema  No rash  XR CHEST 2 VW 1/27/2018 12:10 PM     HISTORY: ; Cough         IMPRESSION: Negative.     CIERA EVERETT MD  Diagnostic Test Results:  No results found for this or any previous visit (from the past 24 hour(s)).    ASSESSMENT/PLAN:     Problem List Items Addressed This Visit     None      Visit Diagnoses     Viral syndrome    -  Primary    Cough        Relevant Orders    XR Chest 2 Views (Completed)               Patient Instructions   Increase rest and fluids. Tylenol and/or Ibuprofen for comfort. Cool mist vaporizer. If your symptoms worsen or do not resolve follow up with your primary care provider in 1 week and sooner if needed.     Mucinex 600 mg 12 hour formula for ear, head and chest congestion.  It can also thin post nasal drip which can cause a cough and sore throat.       Indications for emergent return to emergency department discussed with patient, who verbalized good understanding and agreement.  Patient understands the limitations of today's evaluation.           Viral Syndrome (Adult)  A viral illness may cause a number of symptoms. The symptoms depend on the part of the body that the virus affects. If it settles in your nose, throat, and lungs, it may cause cough, sore throat, congestion, and sometimes headache. If it settles in your stomach and intestinal tract, it may cause vomiting and diarrhea. Sometimes it causes vague symptoms like \"aching all " "over,\" feeling tired, loss of appetite, or fever.  A viral illness usually lasts 1 to 2 weeks, but sometimes it lasts longer. In some cases, a more serious infection can look like a viral syndrome in the first few days of the illness. You may need another exam and additional tests to know the difference. Watch for the warning signs listed below.  Home care  Follow these guidelines for taking care of yourself at home:    If symptoms are severe, rest at home for the first 2 to 3 days.    Stay away from cigarette smoke - both your smoke and the smoke from others.    You may use over-the-counter acetaminophen or ibuprofen for fever, muscle aching, and headache, unless another medicine was prescribed for this. If you have chronic liver or kidney disease or ever had a stomach ulcer or GI bleeding, talk with your doctor before using these medicines. No one who is younger than 18 and ill with a fever should take aspirin. It may cause severe disease or death.    Your appetite may be poor, so a light diet is fine. Avoid dehydration by drinking 8 to 12 8-ounce glasses of fluids each day. This may include water; orange juice; lemonade; apple, grape, and cranberry juice; clear fruit drinks; electrolyte replacement and sports drinks; and decaffeinated teas and coffee. If you have been diagnosed with a kidney disease, ask your doctor how much and what types of fluids you should drink to prevent dehydration. If you have kidney disease, drinking too much fluid can cause it build up in the your body and be dangerous to your health.    Over-the-counter remedies won't shorten the length of the illness but may be helpful for cough, sore throat; and nasal and sinus congestion. Don't use decongestants if you have high blood pressure.  Follow-up care  Follow up with your healthcare provider if you do not improve over the next week.  Call 911  Get emergency medical care if any of the following occur:    Convulsion    Feeling weak, dizzy, or " like you are going to faint    Chest pain, shortness of breath, wheezing, or difficulty breathing  When to seek medical advice  Call your healthcare provider right away if any of these occur:    Cough with lots of colored sputum (mucus) or blood in your sputum    Chest pain, shortness of breath, wheezing, or difficulty breathing    Severe headache; face, neck, or ear pain    Severe, constant pain in the lower right side of your belly (abdominal)    Continued vomiting (can t keep liquids down)    Frequent diarrhea (more than 5 times a day); blood (red or black color) or mucus in diarrhea    Feeling weak, dizzy, or like you are going to faint    Extreme thirst    Fever of 100.4 F (38 C) or higher, or as directed by your healthcare provider  Date Last Reviewed: 9/25/2015 2000-2017 [employer]. [address] [place] [address], [place] [address]. All rights reserved. This information is not intended as a substitute for professional medical care. Always follow your healthcare professional's instructions.            MICHELLE Barrios [facility]

## 2018-01-27 NOTE — MR AVS SNAPSHOT
"              After Visit Summary   1/27/2018    Julia High    MRN: [id number]           Patient Information     Date Of Birth          1990        Visit Information        Provider Department      1/27/2018 9:50 AM Nubia Sahu APRN [facility]        Today's Diagnoses     Viral syndrome    -  1    Cough          Care Instructions    Increase rest and fluids. Tylenol and/or Ibuprofen for comfort. Cool mist vaporizer. If your symptoms worsen or do not resolve follow up with your primary care provider in 1 week and sooner if needed.     Mucinex 600 mg 12 hour formula for ear, head and chest congestion.  It can also thin post nasal drip which can cause a cough and sore throat.       Indications for emergent return to emergency department discussed with patient, who verbalized good understanding and agreement.  Patient understands the limitations of today's evaluation.           Viral Syndrome (Adult)  A viral illness may cause a number of symptoms. The symptoms depend on the part of the body that the virus affects. If it settles in your nose, throat, and lungs, it may cause cough, sore throat, congestion, and sometimes headache. If it settles in your stomach and intestinal tract, it may cause vomiting and diarrhea. Sometimes it causes vague symptoms like \"aching all over,\" feeling tired, loss of appetite, or fever.  A viral illness usually lasts 1 to 2 weeks, but sometimes it lasts longer. In some cases, a more serious infection can look like a viral syndrome in the first few days of the illness. You may need another exam and additional tests to know the difference. Watch for the warning signs listed below.  Home care  Follow these guidelines for taking care of yourself at home:    If symptoms are severe, rest at home for the first 2 to 3 days.    Stay away from cigarette smoke - both your smoke and the smoke from others.    You may use " over-the-counter acetaminophen or ibuprofen for fever, muscle aching, and headache, unless another medicine was prescribed for this. If you have chronic liver or kidney disease or ever had a stomach ulcer or GI bleeding, talk with your doctor before using these medicines. No one who is younger than 18 and ill with a fever should take aspirin. It may cause severe disease or death.    Your appetite may be poor, so a light diet is fine. Avoid dehydration by drinking 8 to 12 8-ounce glasses of fluids each day. This may include water; orange juice; lemonade; apple, grape, and cranberry juice; clear fruit drinks; electrolyte replacement and sports drinks; and decaffeinated teas and coffee. If you have been diagnosed with a kidney disease, ask your doctor how much and what types of fluids you should drink to prevent dehydration. If you have kidney disease, drinking too much fluid can cause it build up in the your body and be dangerous to your health.    Over-the-counter remedies won't shorten the length of the illness but may be helpful for cough, sore throat; and nasal and sinus congestion. Don't use decongestants if you have high blood pressure.  Follow-up care  Follow up with your healthcare provider if you do not improve over the next week.  Call 911  Get emergency medical care if any of the following occur:    Convulsion    Feeling weak, dizzy, or like you are going to faint    Chest pain, shortness of breath, wheezing, or difficulty breathing  When to seek medical advice  Call your healthcare provider right away if any of these occur:    Cough with lots of colored sputum (mucus) or blood in your sputum    Chest pain, shortness of breath, wheezing, or difficulty breathing    Severe headache; face, neck, or ear pain    Severe, constant pain in the lower right side of your belly (abdominal)    Continued vomiting (can t keep liquids down)    Frequent diarrhea (more than 5 times a day); blood (red or black color) or mucus  "in diarrhea    Feeling weak, dizzy, or like you are going to faint    Extreme thirst    Fever of 100.4 F (38 C) or higher, or as directed by your healthcare provider  Date Last Reviewed: 9/25/2015 2000-2017 [employer]. [address] [place] [address]. All rights reserved. This information is not intended as a substitute for professional medical care. Always follow your healthcare professional's instructions.                Follow-ups after your visit        Follow-up notes from your care team     See patient instructions section of the AVS Return if symptoms worsen or fail to improve, for Follow up with your primary care provider.      Who to contact     If you have questions or need follow up information about today's clinic visit or your schedule please contact [facility] directly at [phone].  Normal or non-critical lab and imaging results will be communicated to you by [employer], letter or phone within 4 business days after the clinic has received the results. If you do not hear from us within 7 days, please contact the clinic through [employer] or phone. If you have a critical or abnormal lab result, we will notify you by phone as soon as possible.  Submit refill requests through [employer] or call your pharmacy and they will forward the refill request to us. Please allow 3 business days for your refill to be completed.          Additional Information About Your Visit        [employer] Information     [employer] lets you send messages to your doctor, view your test results, renew your prescriptions, schedule appointments and more. To sign up, go to [email or web address] . Click on \"Log in\" on the left side of the screen, which will take you to the Welcome page. Then click on \"Sign up Now\" on the right side of the page.     You will be asked to enter the access code listed below, as well as some personal information. Please follow the directions to create " your username and password.     Your access code is: [email or web address]  Expires: 2018 12:37 PM     Your access code will  in 90 days. If you need help or a new code, please call your [facility] clinic or [phone].        Care EveryWhere ID     This is your Care EveryWhere ID. This could be used by other organizations to access your [facility] medical records  [id number]        Your Vitals Were     Pulse Temperature Respirations Pulse Oximetry BMI (Body Mass Index)       52 98.2  F (36.8  C) (Tympanic) 16 100% 30.95 kg/m2        Blood Pressure from Last 3 Encounters:   18 116/72   17 122/80   17 114/68    Weight from Last 3 Encounters:   18 186 lb (84.4 kg)   17 184 lb (83.5 kg)   17 184 lb (83.5 kg)               Primary Care Provider    None Specified       No primary provider on file.        Equal Access to Services     [facility] : Hadii paul ku hadasho Soomaali, waaxda luqadaha, qaybta kaalmada adeegyada, waxay trinidad dorsey . So [facility] [phone].    ATENCIÓN: Si habla español, tiene a venegas disposición servicios gratuitos de asistencia lingüística. Llame al [phone].    We comply with applicable federal civil rights laws and [place] laws. We do not discriminate on the basis of race, color, national origin, age, disability, sex, sexual orientation, or gender identity.            Thank you!     Thank you for choosing [facility]  for your care. Our goal is always to provide you with excellent care. Hearing back from our patients is one way we can continue to improve our services. Please take a few minutes to complete the written survey that you may receive in the mail after your visit with us. Thank you!             Your Updated Medication List - Protect others around you: Learn how to safely use, store and throw away your medicines at www.disposemymeds.org.          This list is accurate as of 18 12:37 PM.   Always use your most recent med list.                   Brand Name Dispense Instructions for use Diagnosis    clindamycin-benzoyl peroxide gel    BENZACLIN    50 g    Apply topically 2 times daily    Acne vulgaris       desogestrel-ethinyl estradiol 0.15-30 MG-MCG per tablet    APRI    84 tablet    Take 1 tablet by mouth daily    Encounter for other contraceptive management

## 2018-02-23 ENCOUNTER — PRENATAL OFFICE VISIT (OUTPATIENT)
Dept: OBGYN | Facility: CLINIC | Age: 28
End: 2018-02-23
Payer: COMMERCIAL

## 2018-02-23 VITALS
TEMPERATURE: 96.9 F | BODY MASS INDEX: 30.16 KG/M2 | SYSTOLIC BLOOD PRESSURE: 121 MMHG | DIASTOLIC BLOOD PRESSURE: 77 MMHG | HEART RATE: 72 BPM | WEIGHT: 181 LBS | HEIGHT: 65 IN

## 2018-02-23 DIAGNOSIS — Z32.01 PREGNANCY EXAMINATION OR TEST, POSITIVE RESULT: Primary | ICD-10-CM

## 2018-02-23 LAB
ABO + RH BLD: NORMAL
ABO + RH BLD: NORMAL
BLD GP AB SCN SERPL QL: NORMAL
BLOOD BANK CMNT PATIENT-IMP: NORMAL
ERYTHROCYTE [DISTWIDTH] IN BLOOD BY AUTOMATED COUNT: 13 % (ref 10–15)
HCG UR QL: POSITIVE
HCT VFR BLD AUTO: 40.8 % (ref 35–47)
HGB BLD-MCNC: 13.8 G/DL (ref 11.7–15.7)
MCH RBC QN AUTO: 29.2 PG (ref 26.5–33)
MCHC RBC AUTO-ENTMCNC: 33.8 G/DL (ref 31.5–36.5)
MCV RBC AUTO: 86 FL (ref 78–100)
PLATELET # BLD AUTO: 258 10E9/L (ref 150–450)
RBC # BLD AUTO: 4.73 10E12/L (ref 3.8–5.2)
SPECIMEN EXP DATE BLD: NORMAL
WBC # BLD AUTO: 8 10E9/L (ref 4–11)

## 2018-02-23 PROCEDURE — 87340 HEPATITIS B SURFACE AG IA: CPT | Performed by: OBSTETRICS & GYNECOLOGY

## 2018-02-23 PROCEDURE — 86900 BLOOD TYPING SEROLOGIC ABO: CPT | Performed by: OBSTETRICS & GYNECOLOGY

## 2018-02-23 PROCEDURE — 87591 N.GONORRHOEAE DNA AMP PROB: CPT | Performed by: OBSTETRICS & GYNECOLOGY

## 2018-02-23 PROCEDURE — 86901 BLOOD TYPING SEROLOGIC RH(D): CPT | Performed by: OBSTETRICS & GYNECOLOGY

## 2018-02-23 PROCEDURE — 99207 ZZC FIRST OB VISIT: CPT | Performed by: OBSTETRICS & GYNECOLOGY

## 2018-02-23 PROCEDURE — 85027 COMPLETE CBC AUTOMATED: CPT | Performed by: OBSTETRICS & GYNECOLOGY

## 2018-02-23 PROCEDURE — 36415 COLL VENOUS BLD VENIPUNCTURE: CPT | Performed by: OBSTETRICS & GYNECOLOGY

## 2018-02-23 PROCEDURE — 87389 HIV-1 AG W/HIV-1&-2 AB AG IA: CPT | Performed by: OBSTETRICS & GYNECOLOGY

## 2018-02-23 PROCEDURE — 86762 RUBELLA ANTIBODY: CPT | Performed by: OBSTETRICS & GYNECOLOGY

## 2018-02-23 PROCEDURE — 76817 TRANSVAGINAL US OBSTETRIC: CPT | Performed by: OBSTETRICS & GYNECOLOGY

## 2018-02-23 PROCEDURE — 86780 TREPONEMA PALLIDUM: CPT | Performed by: OBSTETRICS & GYNECOLOGY

## 2018-02-23 PROCEDURE — 81025 URINE PREGNANCY TEST: CPT | Performed by: OBSTETRICS & GYNECOLOGY

## 2018-02-23 PROCEDURE — 87491 CHLMYD TRACH DNA AMP PROBE: CPT | Performed by: OBSTETRICS & GYNECOLOGY

## 2018-02-23 PROCEDURE — 86850 RBC ANTIBODY SCREEN: CPT | Performed by: OBSTETRICS & GYNECOLOGY

## 2018-02-23 NOTE — PROGRESS NOTES
"Julia is a 27 year old  @ uncertain gestation as she has been on OCPs (taking them occasionally on a continuous basis)   She is accompanied by her mother.   Here for new ob visit.    C/o constipation.   Had spotting for 1 week last week.   Occasional cramping.   Reports normal bladder habits.     Pregnancy is unintended and desired for now.     ROS: Ten point review of systems was reviewed and negative except the above.      OBhx: never pregnant  Gyne: denies hx of STIs or abnormal Pap smears.     History reviewed. No pertinent past medical history.  History reviewed. No pertinent surgical history.     Patient Active Problem List    Diagnosis Date Noted     Overweight (BMI 25.0-29.9) 2016     Priority: Medium        Allergies   Allergen Reactions     Ceclor [Cefaclor] Hives     Morphine Hives       Current Outpatient Prescriptions on File Prior to Visit:  clindamycin-benzoyl peroxide (BENZACLIN) gel Apply topically 2 times daily     No current facility-administered medications on file prior to visit.     Past Medical History of Father of Baby:   No significant medical history. Has fathered another child with another woman. Uncertain if he will be supportive of the pregnancy    Physical Exam: /77  Pulse 72  Temp 96.9  F (36.1  C)  Ht 5' 5.25\" (1.657 m)  Wt 181 lb (82.1 kg)  LMP   BMI 29.89 kg/m2  General: Well developed, well nourished female  Skin: No lesions, Warm, moist and No cyanosis or pallor  HEENT: Atraumatic, normocephalic  Neck: Supple,no adenopathy,thyroid normal  Chest: Clear to auscultation  Heart: Regular rate, rhythm  Breasts: Deferred   Abdomen: Soft, flat, non-tender   Extremities: No cyanosis, clubbing, warm and well perfused and No edema  Neurological: Mental Status Normal and Station and Gait Normal   Perineum: Normal   Vulva: Normal genitalia and Bartholin's, Urethra, Fishers's normal  Vagina: Normal mucosa, no discharge  Cervix: Nulliparous, closed, mobile,  no " discharge  Uterus: 8 weeks, Normal shape, position and consistency   Adnexa: No masses, nodularity, tenderness  Rectum: deferred.   Bony Pelvis: Adequate     Transvaginal ultrasound was performed.  A single live intrauterine pregnancy was seen.  CRL = 1.64 cm consistent with 8 weeks, 1 days.  Fetal heart motion was visualized at 160 bpm..            EDC by LMP: unknown   EDC by sono:  10/4/2018  Final EDC: 10/4/2018    A/P 27 year old  at  8w1d by 8w1d US    1. Discussed physician coverage, tertiary support, diet, exercise, weight gain, schedule of visits, routine and indicated ultrasounds, and childbirth education.    2. Options for  testing for chromosomal and birth defects were discussed with the patient including nuchal lucency/blood marker testing in the first trimester and quad screening and/or Level 2 ultrasound in the second trimester.  We discussed that these are screening tests and not diagnostic tests and that false positives and negatives are a distinct possibility.  We discussed that follow up diagnostic testing would include chorionic villus sampling or amniocentesis depending on gestational age. Patient will think about this.     3. Prenatal labs, GC, Chlamydia    4. Prenatal Vitamins encouraged. Constipation encouraged to take stool softeners.     5. SAB precautions reviewed. RTC in 4 weeks.     Neida Paniagua MD  [facility]

## 2018-02-23 NOTE — NURSING NOTE
"Initial /77  Pulse 72  Temp 96.9  F (36.1  C)  Ht 5' 5.25\" (1.657 m)  Wt 181 lb (82.1 kg)  LMP   BMI 29.89 kg/m2 Estimated body mass index is 29.89 kg/(m^2) as calculated from the following:    Height as of this encounter: 5' 5.25\" (1.657 m).    Weight as of this encounter: 181 lb (82.1 kg). .      "

## 2018-02-23 NOTE — MR AVS SNAPSHOT
"              After Visit Summary   2/23/2018    Julia High    MRN: [id number]           Patient Information     Date Of Birth          1990        Visit Information        Provider Department      2/23/2018 10:30 AM Neida Paniagua MD [facility]        Today's Diagnoses     Pregnancy examination or test, positive result    -  1       Follow-ups after your visit        Your next 10 appointments already scheduled     Mar 27, 2018  1:45 PM CDT   ESTABLISHED PRENATAL with Desiree Quevedo MD   [facility]    [address] [address]   [phone]              Who to contact     If you have questions or need follow up information about today's clinic visit or your schedule please contact [facility] directly at [phone].  Normal or non-critical lab and imaging results will be communicated to you by MyChart, letter or phone within 4 business days after the clinic has received the results. If you do not hear from us within 7 days, please contact the clinic through MyChart or phone. If you have a critical or abnormal lab result, we will notify you by phone as soon as possible.  Submit refill requests through [employer] or call your pharmacy and they will forward the refill request to us. Please allow 3 business days for your refill to be completed.          Additional Information About Your Visit        MyChart Information     [employer] lets you send messages to your doctor, view your test results, renew your prescriptions, schedule appointments and more. To sign up, go to [email or web address] . Click on \"Log in\" on the left side of the screen, which will take you to the Welcome page. Then click on \"Sign up Now\" on the right side of the page.     You will be asked to enter the access code listed below, as well as some personal information. Please follow the directions to create your username and password.   " "  Your access code is: [email or web address]  Expires: 2018 12:37 PM     Your access code will  in 90 days. If you need help or a new code, please call your [place] clinic or [phone].        Care EveryWhere ID     This is your Care EveryWhere ID. This could be used by other organizations to access your [place] medical records  [id number]        Your Vitals Were     Pulse Temperature Height BMI (Body Mass Index)          72 96.9  F (36.1  C) 5' 5.25\" (1.657 m) 29.89 kg/m2         Blood Pressure from Last 3 Encounters:   18 121/77   18 116/72   17 122/80    Weight from Last 3 Encounters:   18 181 lb (82.1 kg)   18 186 lb (84.4 kg)   17 184 lb (83.5 kg)              We Performed the Following     ABO/Rh type and screen     Anti Treponema     CBC with platelets     Chlamydia trachomatis PCR     HCG qualitative urine - CSC and Range     Hepatitis B surface antigen     HIV Antigen Antibody Combo     Neisseria gonorrhoeae PCR     Rubella Antibody IgG Quantitative     US OB <14 Weeks w Transvaginal Single          Today's Medication Changes          These changes are accurate as of 18 11:21 AM.  If you have any questions, ask your nurse or doctor.               Stop taking these medicines if you haven't already. Please contact your care team if you have questions.     ENSKYCE 0.15-30 MG-MCG per tablet   Generic drug:  desogestrel-ethinyl estradiol   Stopped by:  Neida Paniagua MD                    Primary Care Provider Fax #    Physician No Ref-Primary [phone]       No address on file        Equal Access to Services     ANDREWS TOVAR : dori Byrne, ketan tejeda. So [facility] [phone].    ATENCIÓN: Si habla español, tiene a venegas disposición servicios gratuitos de asistencia lingüística. Llame al [phone].    We comply with applicable federal civil rights laws and " [place] laws. We do not discriminate on the basis of race, color, national origin, age, disability, sex, sexual orientation, or gender identity.            Thank you!     Thank you for choosing [facility]  for your care. Our goal is always to provide you with excellent care. Hearing back from our patients is one way we can continue to improve our services. Please take a few minutes to complete the written survey that you may receive in the mail after your visit with us. Thank you!             Your Updated Medication List - Protect others around you: Learn how to safely use, store and throw away your medicines at www.disposemymeds.org.          This list is accurate as of 2/23/18 11:21 AM.  Always use your most recent med list.                   Brand Name Dispense Instructions for use Diagnosis    clindamycin-benzoyl peroxide gel    BENZACLIN    50 g    Apply topically 2 times daily    Acne vulgaris

## 2018-02-24 LAB — T PALLIDUM IGG+IGM SER QL: NEGATIVE

## 2018-02-25 LAB
C TRACH DNA SPEC QL NAA+PROBE: NEGATIVE
N GONORRHOEA DNA SPEC QL NAA+PROBE: NEGATIVE
SPECIMEN SOURCE: NORMAL
SPECIMEN SOURCE: NORMAL

## 2018-02-26 LAB
HBV SURFACE AG SERPL QL IA: NONREACTIVE
HIV 1+2 AB+HIV1 P24 AG SERPL QL IA: NONREACTIVE
RUBV IGG SERPL IA-ACNC: 24 IU/ML

## 2018-02-27 PROBLEM — O26.899 RH NEGATIVE STATE IN ANTEPARTUM PERIOD: Status: ACTIVE | Noted: 2018-02-27

## 2018-02-27 PROBLEM — Z67.91 RH NEGATIVE STATE IN ANTEPARTUM PERIOD: Status: ACTIVE | Noted: 2018-02-27

## 2018-02-27 NOTE — PROGRESS NOTES
Normal OB prenatal labs.   Rh negative status  Will need urine culture as part of her new OB labs.     Neida Paniagua MD  [facility]

## 2018-03-27 ENCOUNTER — PRENATAL OFFICE VISIT (OUTPATIENT)
Dept: OBGYN | Facility: CLINIC | Age: 28
End: 2018-03-27
Payer: COMMERCIAL

## 2018-03-27 VITALS
DIASTOLIC BLOOD PRESSURE: 77 MMHG | WEIGHT: 179.2 LBS | BODY MASS INDEX: 29.85 KG/M2 | TEMPERATURE: 97.6 F | HEART RATE: 61 BPM | RESPIRATION RATE: 16 BRPM | SYSTOLIC BLOOD PRESSURE: 110 MMHG | HEIGHT: 65 IN

## 2018-03-27 DIAGNOSIS — Z34.91 PRENATAL CARE, FIRST TRIMESTER: ICD-10-CM

## 2018-03-27 DIAGNOSIS — Z34.02 PRIMIGRAVIDA IN SECOND TRIMESTER: Primary | ICD-10-CM

## 2018-03-27 PROBLEM — Z34.00 PREGNANCY, FIRST: Status: ACTIVE | Noted: 2018-03-27

## 2018-03-27 LAB
ALBUMIN UR-MCNC: NEGATIVE MG/DL
APPEARANCE UR: CLEAR
BILIRUB UR QL STRIP: NEGATIVE
COLOR UR AUTO: YELLOW
GLUCOSE UR STRIP-MCNC: NEGATIVE MG/DL
HGB UR QL STRIP: NEGATIVE
KETONES UR STRIP-MCNC: 15 MG/DL
LEUKOCYTE ESTERASE UR QL STRIP: NEGATIVE
NITRATE UR QL: NEGATIVE
PH UR STRIP: 5.5 PH (ref 5–7)
SOURCE: ABNORMAL
SP GR UR STRIP: 1.02 (ref 1–1.03)
UROBILINOGEN UR STRIP-ACNC: 0.2 EU/DL (ref 0.2–1)

## 2018-03-27 PROCEDURE — 99207 ZZC PRENATAL VISIT: CPT | Performed by: OBSTETRICS & GYNECOLOGY

## 2018-03-27 PROCEDURE — 81003 URINALYSIS AUTO W/O SCOPE: CPT | Performed by: OBSTETRICS & GYNECOLOGY

## 2018-03-27 PROCEDURE — 87086 URINE CULTURE/COLONY COUNT: CPT | Performed by: OBSTETRICS & GYNECOLOGY

## 2018-03-27 NOTE — MR AVS SNAPSHOT
"              After Visit Summary   3/27/2018    Julia High    MRN: [id number]           Patient Information     Date Of Birth          1990        Visit Information        Provider Department      3/27/2018 1:45 PM Desiree Quevedo MD [facility]        Today's Diagnoses     Primigravida in second trimester    -  1    Prenatal care, first trimester           Follow-ups after your visit        Who to contact     If you have questions or need follow up information about today's clinic visit or your schedule please contact [facility] directly at [phone].  Normal or non-critical lab and imaging results will be communicated to you by [employer], letter or phone within 4 business days after the clinic has received the results. If you do not hear from us within 7 days, please contact the clinic through [employer] or phone. If you have a critical or abnormal lab result, we will notify you by phone as soon as possible.  Submit refill requests through [employer] or call your pharmacy and they will forward the refill request to us. Please allow 3 business days for your refill to be completed.          Additional Information About Your Visit        MyChart Information     [employer] lets you send messages to your doctor, view your test results, renew your prescriptions, schedule appointments and more. To sign up, go to [email or web address] . Click on \"Log in\" on the left side of the screen, which will take you to the Welcome page. Then click on \"Sign up Now\" on the right side of the page.     You will be asked to enter the access code listed below, as well as some personal information. Please follow the directions to create your username and password.     Your access code is: [email or web address]  Expires: 2018  1:37 PM     Your access code will  in 90 days. If you need help or a new code, please call your [facility] or [phone].        Care EveryWhere ID     This is your Care " "EveryWhere ID. This could be used by other organizations to access your [place] medical records  [id number]        Your Vitals Were     Pulse Temperature Respirations Height BMI (Body Mass Index)       61 97.6  F (36.4  C) (Tympanic) 16 5' 5.25\" (1.657 m) 29.59 kg/m2        Blood Pressure from Last 3 Encounters:   03/27/18 110/77   02/23/18 121/77   01/27/18 116/72    Weight from Last 3 Encounters:   03/27/18 179 lb 3.2 oz (81.3 kg)   02/23/18 181 lb (82.1 kg)   01/27/18 186 lb (84.4 kg)              We Performed the Following     *UA reflex to Microscopic     Urine Culture Aerobic Bacterial        Primary Care Provider Fax #    Physician No Ref-Primary [phone]       No address on file        Equal Access to Services     EIRCA TOVAR : Meka Sanches, dori burnette, yg bolton, ketan dorsey . So [facility] [phone].    ATENCIÓN: Si habla español, tiene a venegas disposición servicios gratuitos de asistencia lingüística. Neetu al [phone].    We comply with applicable federal civil rights laws and [place] laws. We do not discriminate on the basis of race, color, national origin, age, disability, sex, sexual orientation, or gender identity.            Thank you!     Thank you for choosing [facility]  for your care. Our goal is always to provide you with excellent care. Hearing back from our patients is one way we can continue to improve our services. Please take a few minutes to complete the written survey that you may receive in the mail after your visit with us. Thank you!             Your Updated Medication List - Protect others around you: Learn how to safely use, store and throw away your medicines at www.disposemymeds.org.          This list is accurate as of 3/27/18  2:06 PM.  Always use your most recent med list.                   Brand Name Dispense Instructions for use Diagnosis    clindamycin-benzoyl peroxide gel    BENZACLIN "    50 g    Apply topically 2 times daily    Acne vulgaris

## 2018-03-27 NOTE — PROGRESS NOTES
"CC: Here for routine prenatal visit @ 12w5d   HPI: No significant cramping.  No bleeding.  No complaints.     PE: /77 (BP Location: Left arm, Patient Position: Chair, Cuff Size: Adult Regular)  Pulse 61  Temp 97.6  F (36.4  C) (Tympanic)  Resp 16  Ht 5' 5.25\" (1.657 m)  Wt 179 lb 3.2 oz (81.3 kg)  LMP   BMI 29.59 kg/m2   See OB flowsheet    Rh negative  Labs negative    A/P G1 @ 12w5d normal pregnancy    1. Routine prenatal care.  Genetic screening being considered.  Check urine culture (not obtained last visit)    RTC 4 weeks.      Desiree Quevedo M.D.    "

## 2018-03-27 NOTE — NURSING NOTE
"Chief Complaint   Patient presents with     Prenatal Care       Initial /77 (BP Location: Left arm, Patient Position: Chair, Cuff Size: Adult Regular)  Pulse 61  Temp 97.6  F (36.4  C) (Tympanic)  Resp 16  Ht 5' 5.25\" (1.657 m)  Wt 179 lb 3.2 oz (81.3 kg)  LMP   BMI 29.59 kg/m2 Estimated body mass index is 29.59 kg/(m^2) as calculated from the following:    Height as of this encounter: 5' 5.25\" (1.657 m).    Weight as of this encounter: 179 lb 3.2 oz (81.3 kg).  Medication Reconciliation: complete    Anny PEREZ CMA    "

## 2018-03-28 LAB
BACTERIA SPEC CULT: NO GROWTH
Lab: NORMAL
SPECIMEN SOURCE: NORMAL

## 2018-04-24 ENCOUNTER — PRENATAL OFFICE VISIT (OUTPATIENT)
Dept: OBGYN | Facility: OTHER | Age: 28
End: 2018-04-24
Payer: COMMERCIAL

## 2018-04-24 VITALS
BODY MASS INDEX: 30.47 KG/M2 | WEIGHT: 184.5 LBS | HEART RATE: 68 BPM | SYSTOLIC BLOOD PRESSURE: 112 MMHG | DIASTOLIC BLOOD PRESSURE: 60 MMHG

## 2018-04-24 DIAGNOSIS — O26.899 RH NEGATIVE STATE IN ANTEPARTUM PERIOD: ICD-10-CM

## 2018-04-24 DIAGNOSIS — Z67.91 RH NEGATIVE STATE IN ANTEPARTUM PERIOD: ICD-10-CM

## 2018-04-24 DIAGNOSIS — Z34.02 PRIMIGRAVIDA IN SECOND TRIMESTER: ICD-10-CM

## 2018-04-24 PROCEDURE — 99207 ZZC PRENATAL VISIT: CPT | Performed by: ADVANCED PRACTICE MIDWIFE

## 2018-04-24 RX ORDER — PRENATAL VIT/IRON FUM/FOLIC AC 27MG-0.8MG
1 TABLET ORAL DAILY
COMMUNITY
End: 2022-10-27

## 2018-04-24 NOTE — NURSING NOTE
"Chief Complaint   Patient presents with     Prenatal Care       Initial /60 (BP Location: Right arm, Patient Position: Chair, Cuff Size: Adult Large)  Pulse 68  Wt 184 lb 8 oz (83.7 kg)  LMP   BMI 30.47 kg/m2 Estimated body mass index is 30.47 kg/(m^2) as calculated from the following:    Height as of 3/27/18: 5' 5.25\" (1.657 m).    Weight as of this encounter: 184 lb 8 oz (83.7 kg).  Medication Reconciliation: complete   Tatyana Simeon CMA      "

## 2018-04-24 NOTE — PROGRESS NOTES
Feeling well.  No complaints.   No bleeding/contra  Discussed classes.   Lab work, rh negative and need for rhogam.   Plan ultrasound at next visit.   Declines quad.   RTC 4 weeks.   MICHELLE Rebollar, CNM

## 2018-04-24 NOTE — MR AVS SNAPSHOT
After Visit Summary   4/24/2018    Julia High    MRN: [id number]           Patient Information     Date Of Birth          1990        Visit Information        Provider Department      4/24/2018 8:00 AM Dasha Fay APRN CNM [facility]        Today's Diagnoses     Primigravida in second trimester        Rh negative state in antepartum period           Follow-ups after your visit        Your next 10 appointments already scheduled     May 22, 2018  9:10 AM CDT   (Arrive by 8:55 AM)   US OB > 14 WEEKS COMPLETE SINGLE with ERUS1   [facility] ([facility]    [address] [id number] [phone]           Please bring a list of your medicines (including vitamins, minerals and over-the-counter drugs). Also, tell your doctor about any allergies you may have. Wear comfortable clothes and leave your valuables at home.  If you re less than 20 weeks drink four 8-ounce glasses of fluid an hour before your exam. If you need to empty your bladder before your exam, try to release only a little urine. Then, drink another glass of fluid.  You may have up to two family members in the exam room. If you bring a small child, an adult must be there to care for him or her.  Please call the Imaging Department at your exam site with any questions.            May 22, 2018 10:00 AM CDT   ESTABLISHED PRENATAL with MICHELLE David CNM   [facility]    [address] [id number] [phone]              Future tests that were ordered for you today     Open Future Orders        Priority Expected Expires Ordered    US OB > 14 Weeks Complete Single Routine  7/23/2018 4/24/2018            Who to contact     If you have questions or need follow up information about today's clinic visit or your schedule please contact [facility] directly at [phone].  Normal or  "non-critical lab and imaging results will be communicated to you by MyChart, letter or phone within 4 business days after the clinic has received the results. If you do not hear from us within 7 days, please contact the clinic through [employer] or phone. If you have a critical or abnormal lab result, we will notify you by phone as soon as possible.  Submit refill requests through [employer] or call your pharmacy and they will forward the refill request to us. Please allow 3 business days for your refill to be completed.          Additional Information About Your Visit        [employer] Information     [employer] lets you send messages to your doctor, view your test results, renew your prescriptions, schedule appointments and more. To sign up, go to [email or web address] . Click on \"Log in\" on the left side of the screen, which will take you to the Welcome page. Then click on \"Sign up Now\" on the right side of the page.     You will be asked to enter the access code listed below, as well as some personal information. Please follow the directions to create your username and password.     Your access code is: [email or web address]  Expires: 2018  1:37 PM     Your access code will  in 90 days. If you need help or a new code, please call your [place] clinic or [phone].        Care EveryWhere ID     This is your Care EveryWhere ID. This could be used by other organizations to access your [place] medical records  [id number]        Your Vitals Were     Pulse BMI (Body Mass Index)                68 30.47 kg/m2           Blood Pressure from Last 3 Encounters:   18 112/60   18 110/77   18 121/77    Weight from Last 3 Encounters:   18 184 lb 8 oz (83.7 kg)   18 179 lb 3.2 oz (81.3 kg)   18 181 lb (82.1 kg)               Primary Care Provider Fax #    Physician No Ref-Primary [phone]       No address on file        Equal Access to Services     ERICA TOVAR AH: Meka pacheco " Verónica, dori hugginsmarkel, yg kasonu bolton, ketan derickin hayaan maciekylee danielsae laNicolepallavi gonzalo. So [facility] [phone].    ATENCIÓN: Si chelo contreras, tiene a venegas disposición servicios gratuitos de asistencia lingüística. Neetu al [phone].    We comply with applicable federal civil rights laws and [place] laws. We do not discriminate on the basis of race, color, national origin, age, disability, sex, sexual orientation, or gender identity.            Thank you!     Thank you for choosing [facility]  for your care. Our goal is always to provide you with excellent care. Hearing back from our patients is one way we can continue to improve our services. Please take a few minutes to complete the written survey that you may receive in the mail after your visit with us. Thank you!             Your Updated Medication List - Protect others around you: Learn how to safely use, store and throw away your medicines at www.disposemymeds.org.          This list is accurate as of 4/24/18  9:17 AM.  Always use your most recent med list.                   Brand Name Dispense Instructions for use Diagnosis    clindamycin-benzoyl peroxide gel    BENZACLIN    50 g    Apply topically 2 times daily    Acne vulgaris       prenatal multivitamin plus iron 27-0.8 MG Tabs per tablet      Take 1 tablet by mouth daily

## 2018-05-22 ENCOUNTER — PRENATAL OFFICE VISIT (OUTPATIENT)
Dept: OBGYN | Facility: OTHER | Age: 28
End: 2018-05-22
Payer: COMMERCIAL

## 2018-05-22 ENCOUNTER — RADIANT APPOINTMENT (OUTPATIENT)
Dept: ULTRASOUND IMAGING | Facility: OTHER | Age: 28
End: 2018-05-22
Attending: ADVANCED PRACTICE MIDWIFE
Payer: COMMERCIAL

## 2018-05-22 VITALS
HEART RATE: 60 BPM | BODY MASS INDEX: 31.38 KG/M2 | SYSTOLIC BLOOD PRESSURE: 104 MMHG | WEIGHT: 190 LBS | DIASTOLIC BLOOD PRESSURE: 66 MMHG

## 2018-05-22 DIAGNOSIS — Z23 NEED FOR TDAP VACCINATION: Primary | ICD-10-CM

## 2018-05-22 DIAGNOSIS — Z34.02 PRIMIGRAVIDA IN SECOND TRIMESTER: ICD-10-CM

## 2018-05-22 DIAGNOSIS — O26.899 RH NEGATIVE STATE IN ANTEPARTUM PERIOD: ICD-10-CM

## 2018-05-22 DIAGNOSIS — Z67.91 RH NEGATIVE STATE IN ANTEPARTUM PERIOD: ICD-10-CM

## 2018-05-22 PROCEDURE — 76805 OB US >/= 14 WKS SNGL FETUS: CPT

## 2018-05-22 PROCEDURE — 99207 ZZC PRENATAL VISIT: CPT | Performed by: ADVANCED PRACTICE MIDWIFE

## 2018-05-22 NOTE — MR AVS SNAPSHOT
After Visit Summary   2018    Julia High    MRN: [id number]           Patient Information     Date Of Birth          1990        Visit Information        Provider Department      2018 10:00 AM Dasha Fay APRN [facility]        Today's Diagnoses     Need for Tdap vaccination    -  1    Primigravida in second trimester        Rh negative state in antepartum period          Care Instructions    GESTATIONAL DIABETES SCREENING    All pregnant women are screened at least once for diabetes as part of their prenatal care.  A woman has gestational diabetes if she has high blood sugars for the first time during pregnancy.  Diabetes can harm your health and the health of your baby.  But if we find the diabetes early in pregnancy and we watch your health closely, we can prevent problems.   We will check for diabetes between your 24 and 28 week visit.   Please note you can not do this prior to 24 weeks of gestation.    Please schedule this test between 8 AM and 11 AM or 1 PM and 3:45 PM.  On Monday and Thursday you may schedule your appointment up to 5:45PM in [place] and on  in [place] until 5:45 PM    Plan to spend an hour at the clinic.  When you check in let us know that you will be having your diabetes screening that day.   We will give you a sweet drink and one hour later will draw blood from your arm to check your blood sugar.   We will call you to let you know if your results are normal.  If the results are normal no more testing will be needed.  If your results are not normal we will discuss follow up testing with you.    You may eat prior to the testing but it is not recommended to eat or drink very sweet things such as pop, juice, candy or dessert type foods.   If you have any questions please call:  [place] [phone]      SIGNS OF  LABOR    Labor is  if it happens more than three weeks before your due date.    It can be hard  to know if you are in labor, since the symptoms can be like the normal feelings of pregnancy.  Often, the only difference is the symptoms increase or they don't go away.     Signs of  labor can include:    Change in your vaginal discharge:  You will have more vaginal discharge when you are pregnant and it should be creamy white.  Call the clinic right away if your discharge has a foul odor, pink or bloody,  or if it becomes watery or is more than is normal for you during your pregnancy.    More than 5-6 contractions or tightenings per hour.  Contractions can feel like period cramps or bowel (gas or diarrhea) pain.  You will feel it in the lower part of your abdomen, in your back or as a pressure feeling in your bottom.  It is often regular, coming for 30 seconds or a minute and then going away, only to come back 5 or 10 minutes later. Some contractions are normal during pregnancy, but if you are feeling more than 5-6 in one hour, empty your bladder, then drink 16-24 ounces of water, eat a snack and lay down on your left side. Put your hand on your abdomen to count the contractions.  If after one hour of resting you have still had 5-6 contractions call your clinic.                Follow-ups after your visit        Follow-up notes from your care team     Return in about 1 month (around 2018).      Future tests that were ordered for you today     Open Future Orders        Priority Expected Expires Ordered    US OB Single Follow Up Repeat Routine  2018    ABO/Rh type and screen Routine  2018    Glucose tolerance gest screen 1 hour Routine  2019    OB hemoglobin Routine  2019    Treponema Abs w Reflex to RPR and Titer Routine  2019            Who to contact     If you have questions or need follow up information about today's clinic visit or your schedule please contact [facility] directly at [phone].  Normal or  "non-critical lab and imaging results will be communicated to you by MyChart, letter or phone within 4 business days after the clinic has received the results. If you do not hear from us within 7 days, please contact the clinic through [employer] or phone. If you have a critical or abnormal lab result, we will notify you by phone as soon as possible.  Submit refill requests through [employer] or call your pharmacy and they will forward the refill request to us. Please allow 3 business days for your refill to be completed.          Additional Information About Your Visit        [employer] Information     [employer] lets you send messages to your doctor, view your test results, renew your prescriptions, schedule appointments and more. To sign up, go to [email or web address] . Click on \"Log in\" on the left side of the screen, which will take you to the Welcome page. Then click on \"Sign up Now\" on the right side of the page.     You will be asked to enter the access code listed below, as well as some personal information. Please follow the directions to create your username and password.     Your access code is: [email or web address]  Expires: 2018 10:06 AM     Your access code will  in 90 days. If you need help or a new code, please call your [place] clinic or [phone].        Care EveryWhere ID     This is your Care EveryWhere ID. This could be used by other organizations to access your [place] medical records  [id number]        Your Vitals Were     Pulse BMI (Body Mass Index)                60 31.38 kg/m2           Blood Pressure from Last 3 Encounters:   18 104/66   18 112/60   18 110/77    Weight from Last 3 Encounters:   18 190 lb (86.2 kg)   18 184 lb 8 oz (83.7 kg)   18 179 lb 3.2 oz (81.3 kg)               Primary Care Provider Fax #    Physician No Ref-Primary [phone]       No address on file        Equal Access to Services     ERICA TOVAR AH: Meka pacheco " Verónica, dori hugginsmarkel, yg kasonu bolton, ketan derickin hayaan maciekylee danielsae laNicolepallavi gonzalo. So [facility] [phone].    ATENCIÓN: Si chelo contreras, tiene a venegas disposición servicios gratuitos de asistencia lingüística. Neetu al [phone].    We comply with applicable federal civil rights laws and [place] laws. We do not discriminate on the basis of race, color, national origin, age, disability, sex, sexual orientation, or gender identity.            Thank you!     Thank you for choosing [facility]  for your care. Our goal is always to provide you with excellent care. Hearing back from our patients is one way we can continue to improve our services. Please take a few minutes to complete the written survey that you may receive in the mail after your visit with us. Thank you!             Your Updated Medication List - Protect others around you: Learn how to safely use, store and throw away your medicines at www.disposemymeds.org.          This list is accurate as of 5/22/18 10:06 AM.  Always use your most recent med list.                   Brand Name Dispense Instructions for use Diagnosis    clindamycin-benzoyl peroxide gel    BENZACLIN    50 g    Apply topically 2 times daily    Acne vulgaris       prenatal multivitamin plus iron 27-0.8 MG Tabs per tablet      Take 1 tablet by mouth daily

## 2018-05-22 NOTE — NURSING NOTE
"Chief Complaint   Patient presents with     Prenatal Care       Initial /66 (BP Location: Left arm, Patient Position: Chair, Cuff Size: Adult Large)  Pulse 60  Wt 190 lb (86.2 kg)  LMP   BMI 31.38 kg/m2 Estimated body mass index is 31.38 kg/(m^2) as calculated from the following:    Height as of 3/27/18: 5' 5.25\" (1.657 m).    Weight as of this encounter: 190 lb (86.2 kg).  Medication Reconciliation: grant Simeon CMA    "

## 2018-06-11 ENCOUNTER — TELEPHONE (OUTPATIENT)
Dept: FAMILY MEDICINE | Facility: OTHER | Age: 28
End: 2018-06-11

## 2018-06-11 NOTE — TELEPHONE ENCOUNTER
Julia High is a 27 year old female who calls with vomiting during pregnancy. nausea    NURSING ASSESSMENT:  Description:  Spoke with patient.states she has a burning at the bottom of her throat. Started after eating enchiladas yesterday. Didn't think they were spicy.  Has not been able to eat much today.    Allergies:   Allergies   Allergen Reactions     Ceclor [Cefaclor] Hives     Morphine Hives       MEDICATIONS:   Current Outpatient Prescriptions   Medication     clindamycin-benzoyl peroxide (BENZACLIN) gel     Prenatal Vit-Fe Fumarate-FA (PRENATAL MULTIVITAMIN PLUS IRON) 27-0.8 MG TABS per tablet     No current facility-administered medications for this visit.        NURSING PLAN: Nursing advice to patient infromed her this is common with pregnancy, can try an OTC tums, zantac.  drink fluids, avoid spicy foods,  if not improving needs to be seen    RECOMMENDED DISPOSITION:  Home care advice -   Will comply with recommendation: Yes  If further questions/concerns or if symptoms do not improve, worsen or new symptoms develop, call your PCP or [facility] as soon as possible.      Guideline used:2nd trimester indigestion  Telephone Triage for Obstetrics and Gynecology, Jewell Martinez and Madhavi Heaton, RN, BSN

## 2018-06-19 ENCOUNTER — RADIANT APPOINTMENT (OUTPATIENT)
Dept: ULTRASOUND IMAGING | Facility: OTHER | Age: 28
End: 2018-06-19
Attending: ADVANCED PRACTICE MIDWIFE
Payer: COMMERCIAL

## 2018-06-19 ENCOUNTER — PRENATAL OFFICE VISIT (OUTPATIENT)
Dept: OBGYN | Facility: OTHER | Age: 28
End: 2018-06-19
Payer: COMMERCIAL

## 2018-06-19 VITALS
DIASTOLIC BLOOD PRESSURE: 60 MMHG | SYSTOLIC BLOOD PRESSURE: 104 MMHG | WEIGHT: 200.25 LBS | BODY MASS INDEX: 33.07 KG/M2 | HEART RATE: 64 BPM

## 2018-06-19 DIAGNOSIS — Z67.91 RH NEGATIVE STATE IN ANTEPARTUM PERIOD: ICD-10-CM

## 2018-06-19 DIAGNOSIS — Z34.02 PRIMIGRAVIDA IN SECOND TRIMESTER: ICD-10-CM

## 2018-06-19 DIAGNOSIS — O26.899 RH NEGATIVE STATE IN ANTEPARTUM PERIOD: ICD-10-CM

## 2018-06-19 LAB
ABO + RH BLD: NORMAL
ABO + RH BLD: NORMAL
BLD GP AB SCN SERPL QL: NORMAL
BLOOD BANK CMNT PATIENT-IMP: NORMAL
GLUCOSE 1H P 50 G GLC PO SERPL-MCNC: 83 MG/DL (ref 60–129)
HGB BLD-MCNC: 11.9 G/DL (ref 11.7–15.7)
SPECIMEN EXP DATE BLD: NORMAL

## 2018-06-19 PROCEDURE — 76816 OB US FOLLOW-UP PER FETUS: CPT

## 2018-06-19 PROCEDURE — 86901 BLOOD TYPING SEROLOGIC RH(D): CPT | Performed by: ADVANCED PRACTICE MIDWIFE

## 2018-06-19 PROCEDURE — 82950 GLUCOSE TEST: CPT | Performed by: ADVANCED PRACTICE MIDWIFE

## 2018-06-19 PROCEDURE — 00000218 ZZHCL STATISTIC OBHBG - HEMOGLOBIN: Performed by: ADVANCED PRACTICE MIDWIFE

## 2018-06-19 PROCEDURE — 99207 ZZC PRENATAL VISIT: CPT | Performed by: ADVANCED PRACTICE MIDWIFE

## 2018-06-19 PROCEDURE — 36415 COLL VENOUS BLD VENIPUNCTURE: CPT | Performed by: ADVANCED PRACTICE MIDWIFE

## 2018-06-19 PROCEDURE — 86900 BLOOD TYPING SEROLOGIC ABO: CPT | Performed by: ADVANCED PRACTICE MIDWIFE

## 2018-06-19 PROCEDURE — 86780 TREPONEMA PALLIDUM: CPT | Performed by: ADVANCED PRACTICE MIDWIFE

## 2018-06-19 PROCEDURE — 86850 RBC ANTIBODY SCREEN: CPT | Performed by: ADVANCED PRACTICE MIDWIFE

## 2018-06-19 NOTE — NURSING NOTE
"Chief Complaint   Patient presents with     Prenatal Care       Initial /60 (BP Location: Left arm, Patient Position: Chair, Cuff Size: Adult Large)  Pulse 64  Wt 200 lb 4 oz (90.8 kg)  LMP   BMI 33.07 kg/m2 Estimated body mass index is 33.07 kg/(m^2) as calculated from the following:    Height as of 3/27/18: 5' 5.25\" (1.657 m).    Weight as of this encounter: 200 lb 4 oz (90.8 kg).  Medication Reconciliation: complete    Tatyana Simeon CMA    "

## 2018-06-19 NOTE — MR AVS SNAPSHOT
"              After Visit Summary   2018    Julia High    MRN: [id number]           Patient Information     Date Of Birth          1990        Visit Information        Provider Department      2018 9:45 AM Dasha Fay APRN CNM [facility]        Today's Diagnoses     Primigravida in second trimester        Rh negative state in antepartum period           Follow-ups after your visit        Follow-up notes from your care team     Return in about 1 month (around 2018).      Who to contact     If you have questions or need follow up information about today's clinic visit or your schedule please contact [facility] directly at [phone].  Normal or non-critical lab and imaging results will be communicated to you by MyChart, letter or phone within 4 business days after the clinic has received the results. If you do not hear from us within 7 days, please contact the clinic through MyChart or phone. If you have a critical or abnormal lab result, we will notify you by phone as soon as possible.  Submit refill requests through [employer] or call your pharmacy and they will forward the refill request to us. Please allow 3 business days for your refill to be completed.          Additional Information About Your Visit        MyChart Information     [employer] lets you send messages to your doctor, view your test results, renew your prescriptions, schedule appointments and more. To sign up, go to [email or web address] . Click on \"Log in\" on the left side of the screen, which will take you to the Welcome page. Then click on \"Sign up Now\" on the right side of the page.     You will be asked to enter the access code listed below, as well as some personal information. Please follow the directions to create your username and password.     Your access code is: [email or web address]  Expires: 2018 10:06 AM     Your access code will  in 90 days. If you need help or a new code, " please call your [place] clinic or [phone].        Care EveryWhere ID     This is your Care EveryWhere ID. This could be used by other organizations to access your [place] medical records  [id number]        Your Vitals Were     Pulse BMI (Body Mass Index)                64 33.07 kg/m2           Blood Pressure from Last 3 Encounters:   06/19/18 104/60   05/22/18 104/66   04/24/18 112/60    Weight from Last 3 Encounters:   06/19/18 200 lb 4 oz (90.8 kg)   05/22/18 190 lb (86.2 kg)   04/24/18 184 lb 8 oz (83.7 kg)              We Performed the Following     ABO/Rh type and screen     Glucose tolerance gest screen 1 hour     OB hemoglobin     Treponema Abs w Reflex to RPR and Titer        Primary Care Provider Fax #    Physician No Ref-Primary [phone]       No address on file        Equal Access to Services     [facility]: Hadii paul Sanches, wastephanie burnette, yg kaalmacharly bolton, ketan dorsey . So [place] [phone].    ATENCIÓN: Si habla español, tiene a venegas disposición servicios gratuitos de asistencia lingüística. Llame al [phone].    We comply with applicable federal civil rights laws and [place] laws. We do not discriminate on the basis of race, color, national origin, age, disability, sex, sexual orientation, or gender identity.            Thank you!     Thank you for choosing [facility]  for your care. Our goal is always to provide you with excellent care. Hearing back from our patients is one way we can continue to improve our services. Please take a few minutes to complete the written survey that you may receive in the mail after your visit with us. Thank you!             Your Updated Medication List - Protect others around you: Learn how to safely use, store and throw away your medicines at www.disposemymeds.org.          This list is accurate as of 6/19/18 10:00 AM.  Always use your most recent med list.                   Brand  Name Dispense Instructions for use Diagnosis    clindamycin-benzoyl peroxide gel    BENZACLIN    50 g    Apply topically 2 times daily    Acne vulgaris       prenatal multivitamin plus iron 27-0.8 MG Tabs per tablet      Take 1 tablet by mouth daily

## 2018-06-19 NOTE — PROGRESS NOTES
Now working in [facility].  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   Reassured she should have enough warning.   Has parents for help.   Doesn't need her dog designated as a therapy dog any longer.   Interested in classes but not sure how she will pay for them.  Has WIC.  Reviewed RH, TDAP   Doing GCT today.   RTC 1 month.   Dasha Mast, MICHELLE, CNM

## 2018-06-20 LAB — T PALLIDUM AB SER QL: NONREACTIVE

## 2018-07-17 ENCOUNTER — PRENATAL OFFICE VISIT (OUTPATIENT)
Dept: OBGYN | Facility: OTHER | Age: 28
End: 2018-07-17
Payer: COMMERCIAL

## 2018-07-17 VITALS
WEIGHT: 204.75 LBS | DIASTOLIC BLOOD PRESSURE: 72 MMHG | SYSTOLIC BLOOD PRESSURE: 104 MMHG | HEART RATE: 68 BPM | BODY MASS INDEX: 33.81 KG/M2

## 2018-07-17 DIAGNOSIS — Z67.91 RH NEGATIVE STATE IN ANTEPARTUM PERIOD: ICD-10-CM

## 2018-07-17 DIAGNOSIS — Z23 NEED FOR TDAP VACCINATION: Primary | ICD-10-CM

## 2018-07-17 DIAGNOSIS — Z34.02 PRIMIGRAVIDA IN SECOND TRIMESTER: ICD-10-CM

## 2018-07-17 DIAGNOSIS — O26.899 RH NEGATIVE STATE IN ANTEPARTUM PERIOD: ICD-10-CM

## 2018-07-17 PROCEDURE — 90715 TDAP VACCINE 7 YRS/> IM: CPT | Performed by: ADVANCED PRACTICE MIDWIFE

## 2018-07-17 PROCEDURE — 99207 ZZC PRENATAL VISIT: CPT | Performed by: ADVANCED PRACTICE MIDWIFE

## 2018-07-17 PROCEDURE — 96372 THER/PROPH/DIAG INJ SC/IM: CPT | Performed by: ADVANCED PRACTICE MIDWIFE

## 2018-07-17 PROCEDURE — 90471 IMMUNIZATION ADMIN: CPT | Performed by: ADVANCED PRACTICE MIDWIFE

## 2018-07-17 NOTE — MR AVS SNAPSHOT
"              After Visit Summary   7/17/2018    Julia High    MRN: [id number]           Patient Information     Date Of Birth          1990        Visit Information        Provider Department      7/17/2018 9:45 AM Dasha Fay APRN CNM [facility]        Today's Diagnoses     Need for Tdap vaccination    -  1    Primigravida in second trimester        Rh negative state in antepartum period           Follow-ups after your visit        Your next 10 appointments already scheduled     Jul 31, 2018 10:15 AM CDT   ESTABLISHED PRENATAL with MICHELLE David CNM   [facility] ([facility])    [address] [facility] [id number] [phone]              Who to contact     If you have questions or need follow up information about today's clinic visit or your schedule please contact [facility] directly at [phone].  Normal or non-critical lab and imaging results will be communicated to you by MyChart, letter or phone within 4 business days after the clinic has received the results. If you do not hear from us within 7 days, please contact the clinic through [employer] or phone. If you have a critical or abnormal lab result, we will notify you by phone as soon as possible.  Submit refill requests through [employer] or call your pharmacy and they will forward the refill request to us. Please allow 3 business days for your refill to be completed.          Additional Information About Your Visit        MyChart Information     [employer] lets you send messages to your doctor, view your test results, renew your prescriptions, schedule appointments and more. To sign up, go to [email or web address] . Click on \"Log in\" on the left side of the screen, which will take you to the Welcome page. Then click on \"Sign up Now\" on the right side of the page.     You will be asked to enter the access code listed below, as well as some personal information. " Please follow the directions to create your username and password.     Your access code is: [email or web address]  Expires: 2018 10:49 AM     Your access code will  in 90 days. If you need help or a new code, please call your [place] clinic or [phone].        Care EveryWhere ID     This is your Care EveryWhere ID. This could be used by other organizations to access your [place] medical records  [id number]        Your Vitals Were     Pulse BMI (Body Mass Index)                68 33.81 kg/m2           Blood Pressure from Last 3 Encounters:   18 104/72   18 104/60   18 104/66    Weight from Last 3 Encounters:   18 204 lb 12 oz (92.9 kg)   18 200 lb 4 oz (90.8 kg)   18 190 lb (86.2 kg)              Today, you had the following     No orders found for display       Primary Care Provider Fax #    Physician No Ref-Primary [phone]       No address on file        Equal Access to Services     [facility]: Hadii paul ku hadasho Soomaali, waaxda luqadaha, qaybta kaalmada adeegyada, ketan dorsey . So [facility] [phone].    ATENCIÓN: Si habla español, tiene a venegas disposición servicios gratuitos de asistencia lingüística. Llame al [phone].    We comply with applicable federal civil rights laws and [place] laws. We do not discriminate on the basis of race, color, national origin, age, disability, sex, sexual orientation, or gender identity.            Thank you!     Thank you for choosing [facility]  for your care. Our goal is always to provide you with excellent care. Hearing back from our patients is one way we can continue to improve our services. Please take a few minutes to complete the written survey that you may receive in the mail after your visit with us. Thank you!             Your Updated Medication List - Protect others around you: Learn how to safely use, store and throw away your medicines at www.disposemymeds.org.           This list is accurate as of 7/17/18 11:39 AM.  Always use your most recent med list.                   Brand Name Dispense Instructions for use Diagnosis    clindamycin-benzoyl peroxide gel    BENZACLIN    50 g    Apply topically 2 times daily    Acne vulgaris       prenatal multivitamin plus iron 27-0.8 MG Tabs per tablet      Take 1 tablet by mouth daily

## 2018-07-17 NOTE — PROGRESS NOTES
Feeling well.  No complaints  Not sleeping the greatest  Will get TDAP and Rhogam today.   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  Not moving til Oct 1  Works as a  and will take off almost 4 months.   Has help from parents.   RTC 2 weeks.   Dasha Mast, MICHELLE, LEANNEM

## 2018-07-17 NOTE — NURSING NOTE
"Chief Complaint   Patient presents with     Prenatal Care       Initial /72 (BP Location: Left arm, Patient Position: Chair, Cuff Size: Adult Large)  Pulse 68  Wt 204 lb 12 oz (92.9 kg)  LMP   BMI 33.81 kg/m2 Estimated body mass index is 33.81 kg/(m^2) as calculated from the following:    Height as of 3/27/18: 5' 5.25\" (1.657 m).    Weight as of this encounter: 204 lb 12 oz (92.9 kg).  Medication Reconciliation: complete    Tatyana Simeon CMA    "

## 2018-07-31 ENCOUNTER — PRENATAL OFFICE VISIT (OUTPATIENT)
Dept: OBGYN | Facility: OTHER | Age: 28
End: 2018-07-31
Payer: COMMERCIAL

## 2018-07-31 VITALS
DIASTOLIC BLOOD PRESSURE: 68 MMHG | SYSTOLIC BLOOD PRESSURE: 106 MMHG | HEART RATE: 68 BPM | BODY MASS INDEX: 34.27 KG/M2 | WEIGHT: 207.5 LBS

## 2018-07-31 DIAGNOSIS — Z67.91 RH NEGATIVE STATE IN ANTEPARTUM PERIOD: ICD-10-CM

## 2018-07-31 DIAGNOSIS — O26.899 RH NEGATIVE STATE IN ANTEPARTUM PERIOD: ICD-10-CM

## 2018-07-31 DIAGNOSIS — Z34.02 PRIMIGRAVIDA IN SECOND TRIMESTER: ICD-10-CM

## 2018-07-31 PROCEDURE — 99207 ZZC PRENATAL VISIT: CPT | Performed by: ADVANCED PRACTICE MIDWIFE

## 2018-07-31 NOTE — PROGRESS NOTES
Feeling well.  No complaints other than not sleeping the best.   About once a week has to work 16 hours.  Requests letter that she is limited to 12.   Discussed the difficulties with work restrictions.    No contra/lof/vb  RTC 2 weeks.   MICHELLE Rebollar, CNM

## 2018-07-31 NOTE — NURSING NOTE
"Chief Complaint   Patient presents with     Prenatal Care       Initial /68 (BP Location: Right arm, Patient Position: Sitting, Cuff Size: Adult Large)  Pulse 68  Wt 207 lb 8 oz (94.1 kg)  LMP   BMI 34.27 kg/m2 Estimated body mass index is 34.27 kg/(m^2) as calculated from the following:    Height as of 3/27/18: 5' 5.25\" (1.657 m).    Weight as of this encounter: 207 lb 8 oz (94.1 kg).  Medication Reconciliation: complete    Tatyana Simeon CMA    "

## 2018-07-31 NOTE — MR AVS SNAPSHOT
"              After Visit Summary   7/31/2018    Julia High    MRN: [id number]           Patient Information     Date Of Birth          1990        Visit Information        Provider Department      7/31/2018 10:15 AM Dasha Fay APRN CNM [facility]        Today's Diagnoses     Primigravida in second trimester        Rh negative state in antepartum period           Follow-ups after your visit        Your next 10 appointments already scheduled     Aug 14, 2018 10:15 AM CDT   ESTABLISHED PRENATAL with MICHELLE David CNM   [facility] ([facility])    [address] [facility] [id number] [phone]              Who to contact     If you have questions or need follow up information about today's clinic visit or your schedule please contact [facility] directly at [phone].  Normal or non-critical lab and imaging results will be communicated to you by MyChart, letter or phone within 4 business days after the clinic has received the results. If you do not hear from us within 7 days, please contact the clinic through MyChart or phone. If you have a critical or abnormal lab result, we will notify you by phone as soon as possible.  Submit refill requests through [employer] or call your pharmacy and they will forward the refill request to us. Please allow 3 business days for your refill to be completed.          Additional Information About Your Visit        MyChart Information     [employer] lets you send messages to your doctor, view your test results, renew your prescriptions, schedule appointments and more. To sign up, go to [email or web address] . Click on \"Log in\" on the left side of the screen, which will take you to the Welcome page. Then click on \"Sign up Now\" on the right side of the page.     You will be asked to enter the access code listed below, as well as some personal information. Please follow the directions to create " your username and password.     Your access code is: [email or web address]  Expires: 2018 10:49 AM     Your access code will  in 90 days. If you need help or a new code, please call your [place] clinic or [phone].        Care EveryWhere ID     This is your Care EveryWhere ID. This could be used by other organizations to access your [place] medical records  [id number]        Your Vitals Were     Pulse BMI (Body Mass Index)                68 34.27 kg/m2           Blood Pressure from Last 3 Encounters:   18 106/68   18 104/72   18 104/60    Weight from Last 3 Encounters:   18 207 lb 8 oz (94.1 kg)   18 204 lb 12 oz (92.9 kg)   18 200 lb 4 oz (90.8 kg)              Today, you had the following     No orders found for display       Primary Care Provider Fax #    Physician No Ref-Primary [phone]       No address on file        Equal Access to Services     [facility] : Hadii paul ku hadasho Soomaali, waaxda luqadaha, qaybta kaalmada adeegyada, ketan dorsey . So [facility] [phone].    ATENCIÓN: Si habla español, tiene a venegas disposición servicios gratuitos de asistencia lingüística. Llame al [phone].    We comply with applicable federal civil rights laws and [place] laws. We do not discriminate on the basis of race, color, national origin, age, disability, sex, sexual orientation, or gender identity.            Thank you!     Thank you for choosing [facility]  for your care. Our goal is always to provide you with excellent care. Hearing back from our patients is one way we can continue to improve our services. Please take a few minutes to complete the written survey that you may receive in the mail after your visit with us. Thank you!             Your Updated Medication List - Protect others around you: Learn how to safely use, store and throw away your medicines at www.disposemymeds.org.          This list is accurate as of  7/31/18 10:38 AM.  Always use your most recent med list.                   Brand Name Dispense Instructions for use Diagnosis    clindamycin-benzoyl peroxide gel    BENZACLIN    50 g    Apply topically 2 times daily    Acne vulgaris       prenatal multivitamin plus iron 27-0.8 MG Tabs per tablet      Take 1 tablet by mouth daily

## 2018-07-31 NOTE — LETTER
July 31, 2018      RE: Julia High  [address] [facility] [address]        To whom it may concern:    Julia High is under my professional care for    Primigravida in second trimester  Rh negative state in antepartum period She  may continue to work  However I would recommend that her work hours be limited to no more than 45 hours per week and 12 hours per day due to her pregnancy.     Sincerely,      Dasha Mast, APRN, CNM

## 2018-08-14 ENCOUNTER — PRENATAL OFFICE VISIT (OUTPATIENT)
Dept: OBGYN | Facility: OTHER | Age: 28
End: 2018-08-14
Payer: COMMERCIAL

## 2018-08-14 DIAGNOSIS — Z34.03 ENCOUNTER FOR SUPERVISION OF NORMAL FIRST PREGNANCY IN THIRD TRIMESTER: Primary | ICD-10-CM

## 2018-08-14 DIAGNOSIS — O26.899 RH NEGATIVE STATE IN ANTEPARTUM PERIOD: ICD-10-CM

## 2018-08-14 DIAGNOSIS — Z67.91 RH NEGATIVE STATE IN ANTEPARTUM PERIOD: ICD-10-CM

## 2018-08-14 PROCEDURE — 99207 ZZC PRENATAL VISIT: CPT | Performed by: ADVANCED PRACTICE MIDWIFE

## 2018-08-14 NOTE — PROGRESS NOTES
Feeling well.  No complaints.   Discussed classes. Taking a community BF class.  Uncertain about CBE.  No contra/LOF/VB  Reviewed FM and GBS.   Reviewed s/s of PIH  RTC 2-3 weeks.   MICHELLE Rebollar, LEANNEM

## 2018-08-14 NOTE — MR AVS SNAPSHOT
"              After Visit Summary   8/14/2018    Julia High    MRN: [id number]           Patient Information     Date Of Birth          1990        Visit Information        Provider Department      8/14/2018 10:15 AM Dasha Fay APRN CNM [facility]        Today's Diagnoses     Encounter for supervision of normal first pregnancy in third trimester    -  1    Rh negative state in antepartum period           Follow-ups after your visit        Your next 10 appointments already scheduled     Sep 04, 2018 10:30 AM CDT   ESTABLISHED PRENATAL with MICHELLE David CNM   [facility]    [address] [facility] [id number] [phone]              Who to contact     If you have questions or need follow up information about today's clinic visit or your schedule please contact [facility] directly at [phone].  Normal or non-critical lab and imaging results will be communicated to you by MyChart, letter or phone within 4 business days after the clinic has received the results. If you do not hear from us within 7 days, please contact the clinic through [employer] or phone. If you have a critical or abnormal lab result, we will notify you by phone as soon as possible.  Submit refill requests through [employer] or call your pharmacy and they will forward the refill request to us. Please allow 3 business days for your refill to be completed.          Additional Information About Your Visit        MyChart Information     [employer] lets you send messages to your doctor, view your test results, renew your prescriptions, schedule appointments and more. To sign up, go to [email or web address] . Click on \"Log in\" on the left side of the screen, which will take you to the Welcome page. Then click on \"Sign up Now\" on the right side of the page.     You will be asked to enter the access code listed below, as well as some personal " information. Please follow the directions to create your username and password.     Your access code is: [email or web address]  Expires: 2018 10:49 AM     Your access code will  in 90 days. If you need help or a new code, please call your [place] clinic or [phone].        Care EveryWhere ID     This is your Care EveryWhere ID. This could be used by other organizations to access your [place] medical records  [id number]        Your Vitals Were     Pulse BMI (Body Mass Index)                84 35.42 kg/m2           Blood Pressure from Last 3 Encounters:   18 118/70   18 106/68   18 104/72    Weight from Last 3 Encounters:   18 214 lb 8 oz (97.3 kg)   18 207 lb 8 oz (94.1 kg)   18 204 lb 12 oz (92.9 kg)              Today, you had the following     No orders found for display       Primary Care Provider Fax #    Physician No Ref-Primary [phone]       No address on file        Equal Access to Services     [facility]: Hadii aad ku hadasho Sorebekah, waaxda luqadaha, qaybta kaalmada adeegurban, ketan dorsey . So [facility] [phone].    ATENCIÓN: Si habla español, tiene a venegas disposición servicios gratuitos de asistencia lingüística. Llame al [phone].    We comply with applicable federal civil rights laws and [place] laws. We do not discriminate on the basis of race, color, national origin, age, disability, sex, sexual orientation, or gender identity.            Thank you!     Thank you for choosing [facility]  for your care. Our goal is always to provide you with excellent care. Hearing back from our patients is one way we can continue to improve our services. Please take a few minutes to complete the written survey that you may receive in the mail after your visit with us. Thank you!             Your Updated Medication List - Protect others around you: Learn how to safely use, store and throw away your medicines at  www.disposemymeds.org.          This list is accurate as of 8/14/18 10:44 AM.  Always use your most recent med list.                   Brand Name Dispense Instructions for use Diagnosis    clindamycin-benzoyl peroxide gel    BENZACLIN    50 g    Apply topically 2 times daily    Acne vulgaris       prenatal multivitamin plus iron 27-0.8 MG Tabs per tablet      Take 1 tablet by mouth daily

## 2018-08-14 NOTE — NURSING NOTE
"Chief Complaint   Patient presents with     Prenatal Care       Initial /70 (BP Location: Right leg, Patient Position: Sitting, Cuff Size: Adult Large)  Pulse 84  Wt 214 lb 8 oz (97.3 kg)  LMP   BMI 35.42 kg/m2 Estimated body mass index is 35.42 kg/(m^2) as calculated from the following:    Height as of 3/27/18: 5' 5.25\" (1.657 m).    Weight as of this encounter: 214 lb 8 oz (97.3 kg).  Medication Reconciliation: complete    Tatyana Simeon CMA    "

## 2018-09-04 ENCOUNTER — NURSE TRIAGE (OUTPATIENT)
Dept: NURSING | Facility: CLINIC | Age: 28
End: 2018-09-04

## 2018-09-04 NOTE — TELEPHONE ENCOUNTER
"Pt calling to report she thinks her water may have broke.  After voiding she noticed leakage of a large amount of clear fluid that is continuously trickling.  Denies contractions, vaginal bleeding.  Reports no other symptom.     Disposition: Writer contacted  L&D, nurse stated she already advised pt to be seen there.  Writer notified patient who stated her understanding and will go in now.     Reason for Disposition    Leakage of fluid from vagina    Additional Information    Negative: [1] SEVERE abdominal pain (e.g., excruciating) AND [2] constant AND [3] present > 1 hour    Negative: Severe bleeding (e.g., continuous red blood from vagina, or large blood clots)    Negative: Umbilical cord hanging out of the vagina (shiny, white, curled appearance, \"like telephone cord\")    Negative: Uncontrollable urge to push (i.e., feels like baby is coming out now)    Negative: Can see baby    Negative: Sounds like a life-threatening emergency to the triager    Negative: < 20 weeks pregnant    Negative: Vaginal bleeding    Protocols used: PREGNANCY - RUPTURE OF MEMBRANES-ADULT-    "

## 2018-09-07 VITALS
DIASTOLIC BLOOD PRESSURE: 70 MMHG | HEART RATE: 84 BPM | WEIGHT: 214.5 LBS | SYSTOLIC BLOOD PRESSURE: 118 MMHG | BODY MASS INDEX: 35.42 KG/M2

## 2018-09-10 ENCOUNTER — TELEPHONE (OUTPATIENT)
Dept: OBGYN | Facility: OTHER | Age: 28
End: 2018-09-10

## 2018-09-10 NOTE — TELEPHONE ENCOUNTER
Our goal is to have forms completed with 72 hours, however some forms may require a visit or additional information.    Who is the form from?: [employer] (if other please explain)  Where the form came from: form was faxed in  What clinic location was the form placed at?: [place]  Where the form was placed: 's Box  What number is listed as a contact on the form?:     Phone call message- patient request for a letter, form or note:    Date needed: as soon as possible  Please fax to [phone]  Has the patient signed a consent form for release of information? NO    Additional comments:     Call taken on 9/10/2018 at 12:07 PM by Barbara James    Type of letter, form or note: medical    Forms were filled out, signed and faxed to the [employer] at #[phone], on 09/11/2018. PT was notified.  Kathia Garcia CMA

## 2018-09-21 ENCOUNTER — NURSE TRIAGE (OUTPATIENT)
Dept: NURSING | Facility: CLINIC | Age: 28
End: 2018-09-21

## 2018-09-21 NOTE — TELEPHONE ENCOUNTER
"C/o pain in tailbone especially w/ sitting since delivery of baby on  () Pain is 5/10 with sitting, 1-2 out of 10 when standing or lying down. Was taking ibuprofen and using ice packs. This was helpful but pt stopped because \"I don't want to be taking that stuff every day\". Denies any numbness or weakness in LE. Denies any numbness of groin, genital area or rectal area. Denies any difficulty w/ bowel or bladder other than mild constipation which she is managing w/ daily stool softener. Advised see provider within 3 days per guideline. Discussed guideline home care advice. Pt voiced understanding and agreement. Ladan Hicks RN/FNA        Reason for Disposition    [1] After 3 days (72 hours) AND [2] pain not improving    Additional Information    Negative: Passed out (i.e., lost consciousness, collapsed and was not responding)    Negative: Shock suspected (e.g., cold/pale/clammy skin, too weak to stand, low BP, rapid pulse)    Negative: Sounds like a life-threatening emergency to the triager    Negative: Major injury to the back (e.g., MVA, fall > 10 feet or 3 meters, penetrating injury, etc.)    Followed a tailbone injury    Negative: Dangerous mechanism of injury (e.g., fall > 10 feet or 3 meters, trampoline)(Exception: pain began > 1 hour after injury)    Negative: Sounds like a life-threatening emergency to the triager    Negative: Injury to back above tailbone    Negative: Wound looks infected    Negative: Can't walk or very difficult to walk    Negative: [1] Unable to urinate (or only a few drops) > 4 hours AND     [2] bladder feels very full (e.g., palpable bladder or strong urge to urinate)    Negative: [1] Urinary incontinence (i.e., loss of bladder control) AND [2] new onset    Negative: Numbness (loss of sensation) in groin or rectal area    Negative: Blood in stool    Negative: Blood in urine (red, pink, or tea-colored)    Negative: Weakness of a leg or foot (e.g., unable to bear weight, dragging " foot)    Negative: Numbness in a leg or foot (i.e., loss of sensation)    Negative: [1] SEVERE pain AND [2] not improved 2 hours after pain medicine/ice packs    Negative: Pain radiates into the thigh or further down the leg now    Negative: [1] High-risk adult (e.g., age > 60, osteoporosis, chronic steroid use) AND [2] still hurts    Protocols used: TAILBONE INJURY-ADULT-, BACK PAIN-ADULT-

## 2018-09-27 ENCOUNTER — TELEPHONE (OUTPATIENT)
Dept: OBGYN | Facility: OTHER | Age: 28
End: 2018-09-27

## 2018-09-27 NOTE — TELEPHONE ENCOUNTER
Message handled by Nurse Triage with [facility] - provider name: Dr. Valencia.  Not abnl to have tailbone pain this far out depending on position of baby. May take 6 + wks to resolve.   Patient c/o tailbone pain. Pain persists. Patient reports 5/10 sitting and 1-2/10 when standing or lying down. She is occasionally taking  mg. Using heat packs instead of heat. Explained that spoke a physician and sx is not concerning. Continue to change positions prn, donut pillow if desired, IBP more regularly, continue with heat/cold pack. Offered to be seen by MICHELLE Prather, HILDA to discuss further. Patient declined stating she wanted to know if nl and if it is she is okay to be patient with healing. Scheduled patient for PP exam per patient's request with Dr. Alonzo on 10-17-18 at 1600. Polina Warren RN, BAN

## 2018-10-17 ENCOUNTER — PRENATAL OFFICE VISIT (OUTPATIENT)
Dept: OBGYN | Facility: CLINIC | Age: 28
End: 2018-10-17
Payer: COMMERCIAL

## 2018-10-17 VITALS
OXYGEN SATURATION: 97 % | SYSTOLIC BLOOD PRESSURE: 95 MMHG | TEMPERATURE: 98.2 F | HEART RATE: 62 BPM | BODY MASS INDEX: 34.15 KG/M2 | DIASTOLIC BLOOD PRESSURE: 64 MMHG | WEIGHT: 206.8 LBS

## 2018-10-17 PROCEDURE — 99207 ZZC POST PARTUM EXAM: CPT | Performed by: OBSTETRICS & GYNECOLOGY

## 2018-10-17 NOTE — PROGRESS NOTES
Julia is here for a postpartum checkup.    She had a   Obstetric History       T0      L1     SAB0   TAB0   Ectopic0   Multiple0   Live Births1       # Outcome Date GA Lbr Johnathan/2nd Weight Sex Delivery Anes PTL Lv   1  18 35w6d 13:00 / 00:50 6 lb 7 oz (2.92 kg) F  EPI  XIOMARA      Name: Elle      Complications: PROM (premature rupture of membranes)      Apgar1:  7                Apgar5: 9         Since delivery, she has been breast feeding.  She has not had a normal menses.  She has not had intercourse.  Patient screened for postpartum depression and complaints are NEGATIVE. Screening has also been completed for intimate partner violence.      PE: LMP   There is no height or weight on file to calculate BMI.    General Appearance:  healthy, alert, active, no distress  Cardiovascular:  Regular rate and Rhythm  Lungs:  Clear, without wheeze, rale or rhonchi  Abdomen: Benign, Soft, flat, non-tender, No masses, organomegaly, No inguinal nodes and Bowel sounds normoactive.   Pelvic:       - Ext: Vulva and perineum are normal without lesion, mass or discharge        - Bladder: no tenderness, no masses       - Vagina: Normal mucosa, no discharge       - Cervix: multiparous       - Uterus:Normal shape, position and consistency       - Adnexa: Normal without masses or tenderness       - Rectal: deferred    A/P  Routine Postpartum     - I discussed the new pap recommendations regarding screening.  Explained the rationale for increased intervals between paps.  Questions asked and answered.  She does agree to this regiment.   - Pap was not performed   - Contraception: declines

## 2018-10-17 NOTE — PATIENT INSTRUCTIONS
If you have any questions regarding your visit, Please contact your care team.    [facility]: [phone]      Women s Health CLINIC HOURS TELEPHONE NUMBER   MD Kathia Moore CMA Lisa -    JOSE M Fish       Monday:       7:30-4:30 [place]  Wednesday:       7:30-4:30 [place]  Thursday:       7:30-1:30 [place]  Friday:       7:30-11:30 [facility]  [address] [place]  [address]  [phone] ask for [facility]  [address] [place] [address]  [phone] ask for [facility]    Imaging Scheduling for [place]:  [phone]    Imaging Scheduling for [place]: [phone]       Urgent Care locations:    [facility] Saturday and Sunday   9 am - 5 pm    Monday-Friday   12 pm - 8 pm  Saturday and Sunday   9 am - 5 pm   [phone]     [facility] Labor and Delivery:  [phone]    If you need a medication refill, please contact your pharmacy. Please allow 3 business days for your refill to be completed.  As always, Thank you for trusting us with your healthcare needs!

## 2018-10-17 NOTE — MR AVS SNAPSHOT
After Visit Summary   10/17/2018    Julia High    MRN: [id number]           Patient Information     Date Of Birth          1990        Visit Information        Provider Department      10/17/2018 4:00 PM Chu Alonzo MD [facility]        Today's Diagnoses     Postpartum care and examination    -  1      Care Instructions                                                         If you have any questions regarding your visit, Please contact your care team.    [facility] Access Services: [phone]      Women s Health CLINIC HOURS TELEPHONE NUMBER   MD Kathia Moore CMA Lisa -    JOSE M Fish       Monday:       7:30-4:30 [place]  Wednesday:       7:30-4:30 [place]  Thursday:       7:30-1:30 [place]  Friday:       7:30-11:30 [facility]  [address] [place], [place]  [id number] [phone] ask for [facility]  [address] [place] [address]  [phone] ask for [facility]    Imaging Scheduling for [place]:  [phone]    Imaging Scheduling for [place]: [phone]       Urgent Care locations:    [facility] Saturday and Sunday   9 am - 5 pm    Monday-Friday   12 pm - 8 pm  Saturday and Sunday   9 am - 5 pm   [phone]     [facility] Labor and Delivery:  [phone]    If you need a medication refill, please contact your pharmacy. Please allow 3 business days for your refill to be completed.  As always, Thank you for trusting us with your healthcare needs!              Follow-ups after your visit        Who to contact     If you have questions or need follow up information about today's clinic visit or your schedule please contact [facility] directly at [phone].  Normal or non-critical lab and imaging results will be communicated to you by MyChart, letter or phone within 4 business  days after the clinic has received the results. If you do not hear from us within 7 days, please contact the clinic through [employer] or phone. If you have a critical or abnormal lab result, we will notify you by phone as soon as possible.  Submit refill requests through [employer] or call your pharmacy and they will forward the refill request to us. Please allow 3 business days for your refill to be completed.          Additional Information About Your Visit        [facility] Mailing Information     [employer] gives you secure access to your electronic health record. If you see a primary care provider, you can also send messages to your care team and make appointments. If you have questions, please call your primary care clinic.  If you do not have a primary care provider, please call [phone] and they will assist you.        Care EveryWhere ID     This is your Care EveryWhere ID. This could be used by other organizations to access your [place] medical records  [id number]        Your Vitals Were     Pulse Temperature Pulse Oximetry Breastfeeding? BMI (Body Mass Index)       62 98.2  F (36.8  C) (Oral) 97% Yes 34.15 kg/m2        Blood Pressure from Last 3 Encounters:   10/17/18 95/64   08/14/18 118/70   07/31/18 106/68    Weight from Last 3 Encounters:   10/17/18 206 lb 12.8 oz (93.8 kg)   08/14/18 214 lb 8 oz (97.3 kg)   07/31/18 207 lb 8 oz (94.1 kg)              Today, you had the following     No orders found for display       Primary Care Provider Fax #    Physician No Ref-Primary [phone]       No address on file        Equal Access to Services     ERICA TOVAR : Hadii paul ku hadasho Soalejandroali, waaxda luqadaha, qaybta kaalmada adeegyada, ketan dorsey . So [facility] [phone].    ATENCIÓN: Si habla español, tiene a venegas disposición servicios gratuitos de asistencia lingüística. Llame al [phone].    We comply with applicable federal civil rights laws and [place] laws. We do not discriminate  on the basis of race, color, national origin, age, disability, sex, sexual orientation, or gender identity.            Thank you!     Thank you for choosing [facility]  for your care. Our goal is always to provide you with excellent care. Hearing back from our patients is one way we can continue to improve our services. Please take a few minutes to complete the written survey that you may receive in the mail after your visit with us. Thank you!             Your Updated Medication List - Protect others around you: Learn how to safely use, store and throw away your medicines at www.disposemymeds.org.          This list is accurate as of 10/17/18  4:21 PM.  Always use your most recent med list.                   Brand Name Dispense Instructions for use Diagnosis    clindamycin-benzoyl peroxide gel    BENZACLIN    50 g    Apply topically 2 times daily    Acne vulgaris       prenatal multivitamin plus iron 27-0.8 MG Tabs per tablet      Take 1 tablet by mouth daily

## 2018-10-18 ASSESSMENT — PATIENT HEALTH QUESTIONNAIRE - PHQ9: SUM OF ALL RESPONSES TO PHQ QUESTIONS 1-9: 0

## 2018-12-17 ENCOUNTER — TELEPHONE (OUTPATIENT)
Dept: OBGYN | Facility: OTHER | Age: 28
End: 2018-12-17

## 2018-12-17 NOTE — TELEPHONE ENCOUNTER
Please inform pt that is fine.  MICHELLE Rebollar, CNM            Reason for Call:  Other     Detailed comments: pt states wondering if its ok to use rodan and fields acne treatment while breast feeding. Please advise    Phone Number Patient can be reached at: Cell number on file:    Telephone Information:   Mobile [phone]       Best Time: ANY    Can we leave a detailed message on this number? YES    Call taken on 12/17/2018 at 1:24 PM by Cherie Catherine

## 2018-12-19 NOTE — TELEPHONE ENCOUNTER
"Phone call placed to pt regarding Rodan & Clifford acne tx.  Per huddle with Serenity Rashid APRN, CNP \"No problem. That should be fine to use as long as the product is not being applied to the breasts or the immediate surrounding area.\"  Pt verbalized understanding. Beatrice Arzola RN on 12/19/2018 at 10:33 AM    "

## 2019-11-09 ENCOUNTER — HEALTH MAINTENANCE LETTER (OUTPATIENT)
Age: 29
End: 2019-11-09

## 2020-02-23 ENCOUNTER — HEALTH MAINTENANCE LETTER (OUTPATIENT)
Age: 30
End: 2020-02-23

## 2020-12-06 ENCOUNTER — HEALTH MAINTENANCE LETTER (OUTPATIENT)
Age: 30
End: 2020-12-06

## 2021-04-11 ENCOUNTER — HEALTH MAINTENANCE LETTER (OUTPATIENT)
Age: 31
End: 2021-04-11

## 2021-09-26 ENCOUNTER — HEALTH MAINTENANCE LETTER (OUTPATIENT)
Age: 31
End: 2021-09-26

## 2022-01-07 ENCOUNTER — E-VISIT (OUTPATIENT)
Dept: URGENT CARE | Facility: CLINIC | Age: 32
End: 2022-01-07
Payer: COMMERCIAL

## 2022-01-07 DIAGNOSIS — B96.89 ACUTE BACTERIAL SINUSITIS: Primary | ICD-10-CM

## 2022-01-07 DIAGNOSIS — J01.90 ACUTE BACTERIAL SINUSITIS: Primary | ICD-10-CM

## 2022-01-07 PROCEDURE — 99421 OL DIG E/M SVC 5-10 MIN: CPT | Performed by: NURSE PRACTITIONER

## 2022-01-07 RX ORDER — DOXYCYCLINE HYCLATE 100 MG
100 TABLET ORAL 2 TIMES DAILY
Qty: 14 TABLET | Refills: 0 | Status: SHIPPED | OUTPATIENT
Start: 2022-01-07 | End: 2022-01-14

## 2022-01-07 NOTE — PATIENT INSTRUCTIONS
You may want to try a nasal lavage (also known as nasal irrigation). You can find over-the-counter products, such as Neti-Pot, at retail locations or make your own at home. Instructions for homemade nasal lavage and more information on the process are available online at http://www.aafp.org/afp/2009/1115/p1121.html.    Dear Julia High    After reviewing your responses, I've been able to diagnose you with   a sinus infection caused by bacteria.?     Based on your responses and diagnosis, I have prescribed doxycycline to treat your symptoms. I have sent this to your pharmacy.? If you are breast-feeding you should pump and dump the milk while you are on this antibiotic.  You should also avoid taking any type of multivitamin or calcium supplement while on this antibiotic.    It is also important to stay well hydrated, get lots of rest and take over-the-counter decongestants,?tylenol?or ibuprofen if you?are able to?take those medications per your primary care provider to help relieve discomfort.?     It is important that you take?all of?your prescribed medication even if your symptoms are improving after a few doses.? Taking?all of?your medicine helps prevent the symptoms from returning.?     If your symptoms worsen, you develop severe headache, vomiting, high fever (>102), or are not improving in 7 days, please contact your primary care provider for an appointment or visit any of our convenient Walk-in Care or Urgent Care Centers to be seen which can be found on our website?here.?     Thanks again for choosing?us?as your health care partner,?   ?  Fabienne Alvarado CNP?

## 2022-01-19 ENCOUNTER — VIRTUAL VISIT (OUTPATIENT)
Dept: FAMILY MEDICINE | Facility: CLINIC | Age: 32
End: 2022-01-19
Payer: COMMERCIAL

## 2022-01-19 DIAGNOSIS — H81.10 BENIGN PAROXYSMAL POSITIONAL VERTIGO, UNSPECIFIED LATERALITY: Primary | ICD-10-CM

## 2022-01-19 PROCEDURE — 99203 OFFICE O/P NEW LOW 30 MIN: CPT | Mod: GT | Performed by: FAMILY MEDICINE

## 2022-01-19 RX ORDER — MECLIZINE HYDROCHLORIDE 25 MG/1
25 TABLET ORAL 3 TIMES DAILY PRN
Qty: 20 TABLET | Refills: 0 | Status: SHIPPED | OUTPATIENT
Start: 2022-01-19 | End: 2022-10-27

## 2022-01-19 NOTE — PROGRESS NOTES
Julia is a 31 year old who is being evaluated via a billable video visit.      How would you like to obtain your AVS? MyChart  If the video visit is dropped, the invitation should be resent by:  Will anyone else be joining your video visit? No    Video Start Time: 11:36 AM    Assessment & Plan   1. Benign paroxysmal positional vertigo, unspecified laterality: will trial meclizine and discussed home Epley maneuver. Could consider PT/OT for this. Discussed red flag symptoms.  - meclizine (ANTIVERT) 25 MG tablet; Take 1 tablet (25 mg) by mouth 3 times daily as needed for dizziness  Dispense: 20 tablet; Refill: 0      Return in about 2 weeks (around 2/2/2022), or if symptoms worsen or fail to improve.    Yogesh Drummond MD  [facility]    This chart is completed utilizing dictation software; typos and/or incorrect word substitutions may unintentionally occur.       Subjective   Julia is a 31 year old who presents for the following health issues:    HPI   Finished antibiotic for sinus infection on Sunday. Was on Doxycycline. Since then any time she has rapid head movement, or working out feels the room is spinning. No ear pain or hearing changes. No trauma or other symptoms.    No other concerns.      Review of Systems   Constitutional, HEENT, cardiovascular, pulmonary, gi and gu systems are negative, except as otherwise noted.      Objective           Vitals:  No vitals were obtained today due to virtual visit.    Physical Exam   GENERAL: Healthy, alert and no distress  EYES: Eyes grossly normal to inspection.  No discharge or erythema, or obvious scleral/conjunctival abnormalities.  RESP: No audible wheeze, cough, or visible cyanosis.  No visible retractions or increased work of breathing.    SKIN: Visible skin clear. No significant rash, abnormal pigmentation or lesions.  NEURO: Cranial nerves grossly intact.  Mentation and speech appropriate for age.  PSYCH: Mentation appears  normal, affect normal/bright, judgement and insight intact, normal speech and appearance well-groomed.    Labs: none    Video-Visit Details    Type of service:  Video Visit    Video End Time:11:42 AM    Originating Location (pt. Location): Home    Distant Location (provider location):  [facility]     Platform used for Video Visit: [employer]

## 2022-01-19 NOTE — PATIENT INSTRUCTIONS
Patient Education     Managing Balance Problems: Vestibular Rehabilitation Therapy    Instructions for the Epley maneuver can be found here: https://www.youtube.com/watch?v=[email or web address]  An inner ear problem can knock out part of the balance system. Vestibular rehabilitation therapy helps you learn how to rely on specific parts of your balance system.   Checking eye movement  The therapist will check for nystagmus. This is an automatic, jumpy eye movement. Nystagmus in certain positions can mean an inner ear problem. It can even show which semicircular canal is affected. The therapist will watch your eyes while you move into different positions.   Treating your condition  Treatment can include:    Canalith repositioning. This is a series of guided head and body movements. It helps move crystals, easing symptoms of benign positional vertigo (BPV).    Habituation exercises to retrain your balance system. The therapist will teach you how to do these exercises at home.    Gaze stabilization exercises. These are to retrain the eyes to stay in focus while your head moves. This helps ease the feeling of being unsteady (disequilibrium).    Gait and balance training.  This includes standing and walking on different surfaces. The therapist can teach you how to keep your balance and prevent falls.  Doing habituation exercises  The therapist will teach exercises suited to your condition. Habituation exercises will make you dizzy at first. Just remind yourself that symptoms likely will last for only a minute. If you keep doing the exercises, they will help lessen your dizziness. Then when you do a movement like that in daily life, it is less likely to bring on symptoms.   Getting back into action  Dizziness doesn't have to keep you from exercising. Start with activities that don't make you dizzy. Then ease into more challenging activities. Try these tips:     Always exercise with a partner.    Stop if you have nausea or feel that  you may faint.    Walk on a treadmill, holding on to the handles for support. Be sure you are connected to the treadmill's emergency stop.    Use a ball machine for sports such as tennis until you're ready for a live game. This way you know where to expect the ball.    Don't give up. With time, most people can return to activities and sports.  [employer] last reviewed this educational content on 9/1/2019 2000-2021 [employer]. All rights reserved. This information is not intended as a substitute for professional medical care. Always follow your healthcare professional's instructions.

## 2022-02-02 NOTE — PATIENT INSTRUCTIONS
GESTATIONAL DIABETES SCREENING    All pregnant women are screened at least once for diabetes as part of their prenatal care.  A woman has gestational diabetes if she has high blood sugars for the first time during pregnancy.  Diabetes can harm your health and the health of your baby.  But if we find the diabetes early in pregnancy and we watch your health closely, we can prevent problems.   We will check for diabetes between your 24 and 28 week visit.   Please note you can not do this prior to 24 weeks of gestation.    Please schedule this test between 8 AM and 11 AM or 1 PM and 3:45 PM.  On Monday and Thursday you may schedule your appointment up to 5:45PM in [place] and on Monday's in [place] until 5:45 PM    Plan to spend an hour at the clinic.  When you check in let us know that you will be having your diabetes screening that day.   We will give you a sweet drink and one hour later will draw blood from your arm to check your blood sugar.   We will call you to let you know if your results are normal.  If the results are normal no more testing will be needed.  If your results are not normal we will discuss follow up testing with you.    You may eat prior to the testing but it is not recommended to eat or drink very sweet things such as pop, juice, candy or dessert type foods.   If you have any questions please call:  [place] [phone]      SIGNS OF  LABOR    Labor is  if it happens more than three weeks before your due date.    It can be hard to know if you are in labor, since the symptoms can be like the normal feelings of pregnancy.  Often, the only difference is the symptoms increase or they don't go away.     Signs of  labor can include:    Change in your vaginal discharge:  You will have more vaginal discharge when you are pregnant and it should be creamy white.  Call the clinic right away if your discharge has a foul odor, pink or bloody,  or if it becomes watery or is more than  is normal for you during your pregnancy.    More than 5-6 contractions or tightenings per hour.  Contractions can feel like period cramps or bowel (gas or diarrhea) pain.  You will feel it in the lower part of your abdomen, in your back or as a pressure feeling in your bottom.  It is often regular, coming for 30 seconds or a minute and then going away, only to come back 5 or 10 minutes later. Some contractions are normal during pregnancy, but if you are feeling more than 5-6 in one hour, empty your bladder, then drink 16-24 ounces of water, eat a snack and lay down on your left side. Put your hand on your abdomen to count the contractions.  If after one hour of resting you have still had 5-6 contractions call your clinic.         97

## 2022-04-14 ENCOUNTER — MYC MEDICAL ADVICE (OUTPATIENT)
Dept: FAMILY MEDICINE | Facility: CLINIC | Age: 32
End: 2022-04-14
Payer: COMMERCIAL

## 2022-05-07 ENCOUNTER — HEALTH MAINTENANCE LETTER (OUTPATIENT)
Age: 32
End: 2022-05-07

## 2022-09-28 ENCOUNTER — MYC MEDICAL ADVICE (OUTPATIENT)
Dept: FAMILY MEDICINE | Facility: CLINIC | Age: 32
End: 2022-09-28

## 2022-09-28 NOTE — LETTER
October 13, 2022      Julia ELISHA High  [address]  [place] [address]                  Ellis Henriquezher,     We met in January for your vertigo symptoms. I'm writing to inform you that based on or records, it appears that you are still due for an annual physical/preventative exam. This would include a pap smear.      Please schedule this as soon as you are able.      If you get your preventative care done elsewhere, please let us know so we can collect records and take you off of our contact list for this.     Thank you,     Dr. Rapp

## 2022-10-13 NOTE — TELEPHONE ENCOUNTER
Letter sent  
Please schedule patient.   DYLAN NewN, RN, PHN  [facility]  October 13, 2022    
no deformity, pain or tenderness, no restriction of movement

## 2022-10-22 ENCOUNTER — E-VISIT (OUTPATIENT)
Dept: URGENT CARE | Facility: CLINIC | Age: 32
End: 2022-10-22
Payer: COMMERCIAL

## 2022-10-22 DIAGNOSIS — B96.89 ACUTE BACTERIAL SINUSITIS: Primary | ICD-10-CM

## 2022-10-22 DIAGNOSIS — J01.90 ACUTE BACTERIAL SINUSITIS: Primary | ICD-10-CM

## 2022-10-22 RX ORDER — FLUTICASONE PROPIONATE 50 MCG
1 SPRAY, SUSPENSION (ML) NASAL DAILY
Qty: 16 G | Refills: 1 | Status: SHIPPED | OUTPATIENT
Start: 2022-10-22

## 2022-10-22 RX ORDER — MOXIFLOXACIN HYDROCHLORIDE 400 MG/1
400 TABLET ORAL DAILY
Qty: 7 TABLET | Refills: 0 | Status: SHIPPED | OUTPATIENT
Start: 2022-10-22 | End: 2022-10-27

## 2022-10-22 NOTE — PATIENT INSTRUCTIONS
Dear Julia High    After reviewing your responses, I've been able to diagnose you with?a sinus infection caused by bacteria.?     Based on your responses and diagnosis, I have prescribed Moxifloxacin and flonase  to treat your symptoms. I have sent this to your pharmacy.?     It is also important to stay well hydrated, get lots of rest and take over-the-counter decongestants,?tylenol?or ibuprofen if you?are able to?take those medications per your primary care provider to help relieve discomfort.?     It is important that you take?all of?your prescribed medication even if your symptoms are improving after a few doses.? Taking?all of?your medicine helps prevent the symptoms from returning.?     If your symptoms worsen, you develop severe headache, vomiting, high fever (>102), or are not improving in 7 days, please contact your primary care provider for an appointment or visit any of our convenient Walk-in Care or Urgent Care Centers to be seen which can be found on our website?here.?     Thanks again for choosing?us?as your health care partner,?   ?  Aurea Leonard, MICHELLE CNP?

## 2022-10-27 ENCOUNTER — OFFICE VISIT (OUTPATIENT)
Dept: URGENT CARE | Facility: URGENT CARE | Age: 32
End: 2022-10-27
Payer: COMMERCIAL

## 2022-10-27 ENCOUNTER — E-VISIT (OUTPATIENT)
Dept: URGENT CARE | Facility: CLINIC | Age: 32
End: 2022-10-27

## 2022-10-27 ENCOUNTER — E-VISIT (OUTPATIENT)
Dept: URGENT CARE | Facility: CLINIC | Age: 32
End: 2022-10-27
Payer: COMMERCIAL

## 2022-10-27 VITALS
HEIGHT: 66 IN | TEMPERATURE: 98.3 F | BODY MASS INDEX: 34.51 KG/M2 | WEIGHT: 214.7 LBS | HEART RATE: 64 BPM | DIASTOLIC BLOOD PRESSURE: 84 MMHG | SYSTOLIC BLOOD PRESSURE: 124 MMHG | OXYGEN SATURATION: 100 %

## 2022-10-27 DIAGNOSIS — R11.2 NAUSEA AND VOMITING, UNSPECIFIED VOMITING TYPE: Primary | ICD-10-CM

## 2022-10-27 DIAGNOSIS — L70.9 ACNE, UNSPECIFIED ACNE TYPE: Primary | ICD-10-CM

## 2022-10-27 DIAGNOSIS — J01.90 ACUTE SINUSITIS WITH SYMPTOMS > 10 DAYS: Primary | ICD-10-CM

## 2022-10-27 DIAGNOSIS — H93.13 TINNITUS, BILATERAL: ICD-10-CM

## 2022-10-27 PROCEDURE — 99207 PR NON-BILLABLE SERV PER CHARTING: CPT | Performed by: NURSE PRACTITIONER

## 2022-10-27 PROCEDURE — 99213 OFFICE O/P EST LOW 20 MIN: CPT | Performed by: PHYSICIAN ASSISTANT

## 2022-10-27 RX ORDER — MOXIFLOXACIN HYDROCHLORIDE 400 MG/1
400 TABLET ORAL DAILY
COMMUNITY
End: 2022-10-27

## 2022-10-27 RX ORDER — AZITHROMYCIN 250 MG/1
TABLET, FILM COATED ORAL
Qty: 6 TABLET | Refills: 0 | Status: SHIPPED | OUTPATIENT
Start: 2022-10-27 | End: 2022-11-01

## 2022-10-27 ASSESSMENT — ENCOUNTER SYMPTOMS
MUSCULOSKELETAL NEGATIVE: 1
PALPITATIONS: 0
LIGHT-HEADEDNESS: 0
RHINORRHEA: 0
VOMITING: 0
EYES NEGATIVE: 1
DIARRHEA: 0
SINUS PRESSURE: 1
CARDIOVASCULAR NEGATIVE: 1
ENDOCRINE NEGATIVE: 1
DIZZINESS: 0
BACK PAIN: 0
SHORTNESS OF BREATH: 0
NECK STIFFNESS: 0
HEADACHES: 0
MYALGIAS: 0
RESPIRATORY NEGATIVE: 1
ALLERGIC/IMMUNOLOGIC NEGATIVE: 1
SORE THROAT: 0
ARTHRALGIAS: 0
JOINT SWELLING: 0
COUGH: 0
WEAKNESS: 0
WOUND: 0
CHILLS: 0
NAUSEA: 0
NECK PAIN: 0
SINUS PAIN: 1
FEVER: 0

## 2022-10-27 NOTE — PATIENT INSTRUCTIONS
Dear Julia High,    We are sorry you are not feeling well. Based on the responses you provided, it is recommended that you be seen in-person in urgent care so we can better evaluate your symptoms. Please click here to find the nearest urgent care location to you.   You will not be charged for this Visit. Thank you for trusting us with your care.    MICHELLE Petit CNP

## 2022-10-27 NOTE — PROGRESS NOTES
"Chief Complaint:     Chief Complaint   Patient presents with     Sinus Problem     Sinus infection, she's been treated for it but she can keep the medication.      Nasal Congestion       No results found for any visits on 10/27/22.    Medical Decision Making:    Vital signs reviewed by Sebastian Platt PA-C  /84 (BP Location: Left arm, Patient Position: Sitting, Cuff Size: Adult Large)   Pulse 64   Temp 98.3  F (36.8  C) (Tympanic)   Ht 1.676 m (5' 6\")   Wt 97.4 kg (214 lb 11.2 oz)   SpO2 100%   BMI 34.65 kg/m      ASSESSMENT    1. Acute sinusitis with symptoms > 10 days        PLAN    Patient is in no acute distress.    Temp is 98.3 in clinic today, lung sounds were clear, and O2 sats at 100% on RA.    Will switch to Zithromax as Doxycycline gives her a stomach ache.    Order placed for referral to ENT for ongoing tinnitus. Rest, Push fluids, vaporizer.  Ibuprofen and or Tylenol for any fever or body aches.  If symptoms worsen, recheck immediately otherwise follow up with your PCP in 1 week if symptoms are not improving.  Worrisome symptoms discussed with instructions to go to the ED.  Patient verbalized understanding and agreed with this plan.    Labs:    No results found for any visits on 10/27/22.     Vital signs reviewed by Sebastian Platt PA-C  /84 (BP Location: Left arm, Patient Position: Sitting, Cuff Size: Adult Large)   Pulse 64   Temp 98.3  F (36.8  C) (Tympanic)   Ht 1.676 m (5' 6\")   Wt 97.4 kg (214 lb 11.2 oz)   SpO2 100%   BMI 34.65 kg/m      Current Meds      Current Outpatient Medications:      azithromycin (ZITHROMAX) 250 MG tablet, Take 2 tablets (500 mg) by mouth daily for 1 day, THEN 1 tablet (250 mg) daily for 4 days., Disp: 6 tablet, Rfl: 0     fluticasone (FLONASE) 50 MCG/ACT nasal spray, Spray 1 spray into both nostrils daily, Disp: 16 g, Rfl: 1     moxifloxacin (AVELOX) 400 MG tablet, Take 400 mg by mouth daily, Disp: , Rfl:       Respiratory History    occasional " episodes of bronchitis      SUBJECTIVE    HPI: Julia High is an 31 year old female who presents with facial pain/pressure and nasal congestion.  Symptoms began 10  days ago and has gradually worsening.  Patient started Moxifloxacin 5 days ago, but does not tolerate the medication and vomits.      Patient denies any recent travel or exposure to known COVID positive tested individual.      ROS:     Review of Systems   Constitutional: Negative for chills and fever.   HENT: Positive for sinus pressure and sinus pain. Negative for congestion, ear pain, rhinorrhea and sore throat.    Eyes: Negative.    Respiratory: Negative.  Negative for cough and shortness of breath.    Cardiovascular: Negative.  Negative for chest pain and palpitations.   Gastrointestinal: Negative for diarrhea, nausea and vomiting.   Endocrine: Negative.    Genitourinary: Negative.    Musculoskeletal: Negative.  Negative for arthralgias, back pain, joint swelling, myalgias, neck pain and neck stiffness.   Skin: Negative.  Negative for rash and wound.   Allergic/Immunologic: Negative.  Negative for immunocompromised state.   Neurological: Negative for dizziness, weakness, light-headedness and headaches.         Family History   Family History   Problem Relation Age of Onset     Hypertension Mother      Unknown/Adopted Father         Problem history  Patient Active Problem List   Diagnosis     Overweight (BMI 25.0-29.9)     Rh negative state in antepartum period     Pregnancy, first     Encounter for supervision of normal first pregnancy in third trimester        Allergies  Allergies   Allergen Reactions     Ceclor [Cefaclor] Hives     Morphine Hives        Social History  Social History     Socioeconomic History     Marital status: Single     Spouse name: Not on file     Number of children: 1     Years of education: Not on file     Highest education level: Not on file   Occupational History     Not on file   Tobacco Use     Smoking status: Never  "    Smokeless tobacco: Never   Substance and Sexual Activity     Alcohol use: No     Alcohol/week: 0.0 standard drinks     Drug use: No     Sexual activity: Yes     Partners: Male     Birth control/protection: None   Other Topics Concern     Parent/sibling w/ CABG, MI or angioplasty before 65F 55M? Not Asked   Social History Narrative     Not on file     Social Determinants of Health     Financial Resource Strain: Not on file   Food Insecurity: Not on file   Transportation Needs: Not on file   Physical Activity: Not on file   Stress: Not on file   Social Connections: Not on file   Intimate Partner Violence: Not on file   Housing Stability: Not on file        OBJECTIVE     Vital signs reviewed by Sebastian Platt PA-C  /84 (BP Location: Left arm, Patient Position: Sitting, Cuff Size: Adult Large)   Pulse 64   Temp 98.3  F (36.8  C) (Tympanic)   Ht 1.676 m (5' 6\")   Wt 97.4 kg (214 lb 11.2 oz)   SpO2 100%   BMI 34.65 kg/m       Physical Exam  Vitals and nursing note reviewed.   Constitutional:       General: She is not in acute distress.     Appearance: She is well-developed. She is not ill-appearing, toxic-appearing or diaphoretic.   HENT:      Head: Normocephalic and atraumatic.      Right Ear: Hearing, tympanic membrane, ear canal and external ear normal. Tympanic membrane is not perforated, erythematous, retracted or bulging.      Left Ear: Hearing, tympanic membrane, ear canal and external ear normal. Tympanic membrane is not perforated, erythematous, retracted or bulging.      Nose: Congestion present. No mucosal edema or rhinorrhea.      Right Sinus: No maxillary sinus tenderness or frontal sinus tenderness.      Left Sinus: No maxillary sinus tenderness or frontal sinus tenderness.      Mouth/Throat:      Pharynx: Posterior oropharyngeal erythema present. No pharyngeal swelling, oropharyngeal exudate or uvula swelling.      Tonsils: No tonsillar exudate or tonsillar abscesses. 0 on the right. 0 on " the left.   Eyes:      General:         Right eye: No discharge.         Left eye: No discharge.      Pupils: Pupils are equal, round, and reactive to light.   Cardiovascular:      Rate and Rhythm: Normal rate and regular rhythm.      Heart sounds: Normal heart sounds. No murmur heard.    No friction rub. No gallop.   Pulmonary:      Effort: Pulmonary effort is normal. No respiratory distress.      Breath sounds: Normal breath sounds. No decreased breath sounds, wheezing, rhonchi or rales.   Chest:      Chest wall: No tenderness.   Abdominal:      General: Bowel sounds are normal. There is no distension.      Palpations: Abdomen is soft. There is no mass.      Tenderness: There is no abdominal tenderness. There is no guarding.   Musculoskeletal:      Cervical back: Normal range of motion and neck supple.   Lymphadenopathy:      Head:      Right side of head: No submental, submandibular, tonsillar, preauricular or posterior auricular adenopathy.      Left side of head: No submental, submandibular, tonsillar, preauricular or posterior auricular adenopathy.      Cervical: No cervical adenopathy.      Right cervical: No superficial or posterior cervical adenopathy.     Left cervical: No superficial or posterior cervical adenopathy.   Skin:     General: Skin is warm and dry.      Findings: No rash.   Neurological:      Mental Status: She is alert and oriented to person, place, and time.      Cranial Nerves: No cranial nerve deficit.      Deep Tendon Reflexes: Reflexes are normal and symmetric.   Psychiatric:         Behavior: Behavior normal. Behavior is cooperative.         Thought Content: Thought content normal.         Judgment: Judgment normal.           Sebastian Platt PA-C  10/27/2022, 5:19 PM

## 2022-10-28 NOTE — PATIENT INSTRUCTIONS
Dear Julia High?     After reviewing your responses, I am unable to make a diagnosis that can be treated online.    You will not be charged for this eVisit.     We are dedicated to helping you achieve your best health and would like to see you in one of our many clinic locations - a primary care provider would be ideal for your concern.    Please use [facility] to schedule a visit with a provider or call [phone] ([phone]) to schedule at any of our locations.    Thanks for choosing?us?as your health care partner,?   ?   Erika Knapp, CNP?     See your provider to get a referral to dermatology

## 2023-04-23 ENCOUNTER — HEALTH MAINTENANCE LETTER (OUTPATIENT)
Age: 33
End: 2023-04-23

## 2023-06-02 ENCOUNTER — HEALTH MAINTENANCE LETTER (OUTPATIENT)
Age: 33
End: 2023-06-02

## 2023-11-28 NOTE — PROGRESS NOTES
Detail Level: Zone Feeling well.  No complaints.   Ultrasound today.  Didn't see the profile well so will repeat. Ordered.   Has minimal support and will be alone after the baby is born and wants her dog to be designated as a therapy dog to make it easier to find an apartment.  Recommended to start with FP.   Reviewed GCT and PTl.   RH neg  RTC 4 weeks.   Dasha Mast, MICHELLE, CNM     Detail Level: Generalized

## 2024-06-29 ENCOUNTER — HEALTH MAINTENANCE LETTER (OUTPATIENT)
Age: 34
End: 2024-06-29

## 2025-07-13 ENCOUNTER — HEALTH MAINTENANCE LETTER (OUTPATIENT)
Age: 35
End: 2025-07-13